# Patient Record
Sex: FEMALE | Race: WHITE | Employment: FULL TIME | URBAN - METROPOLITAN AREA
[De-identification: names, ages, dates, MRNs, and addresses within clinical notes are randomized per-mention and may not be internally consistent; named-entity substitution may affect disease eponyms.]

---

## 2017-07-13 ENCOUNTER — OFFICE VISIT (OUTPATIENT)
Dept: FAMILY MEDICINE | Facility: CLINIC | Age: 37
End: 2017-07-13
Payer: COMMERCIAL

## 2017-07-13 VITALS
WEIGHT: 125 LBS | SYSTOLIC BLOOD PRESSURE: 116 MMHG | DIASTOLIC BLOOD PRESSURE: 68 MMHG | HEIGHT: 65 IN | BODY MASS INDEX: 20.83 KG/M2 | OXYGEN SATURATION: 95 % | TEMPERATURE: 97.5 F | HEART RATE: 108 BPM

## 2017-07-13 DIAGNOSIS — M35.00 SJOGREN'S SYNDROME, WITH UNSPECIFIED ORGAN INVOLVEMENT (H): ICD-10-CM

## 2017-07-13 DIAGNOSIS — H00.14 CHALAZION OF LEFT UPPER EYELID: Primary | ICD-10-CM

## 2017-07-13 PROCEDURE — 99203 OFFICE O/P NEW LOW 30 MIN: CPT | Performed by: FAMILY MEDICINE

## 2017-07-13 RX ORDER — CIPROFLOXACIN HYDROCHLORIDE 3.5 MG/ML
1 SOLUTION/ DROPS TOPICAL
Qty: 1 BOTTLE | Refills: 0 | Status: SHIPPED | OUTPATIENT
Start: 2017-07-13 | End: 2017-07-20

## 2017-07-13 NOTE — PATIENT INSTRUCTIONS
Schedule an appointment with the ophthalmologist. There are 2 ophthalmologists here and I will give you information     There is a rheumatologist here, Dr. Antonio, who is great and would be happy to see you here. Make an appointment with him--this will take about 1-2 months and I will make sure to get you on his wait list.     Olesya Larson, one of the internal nurse practitioners here. I will give you her information as well. I'd recommend a physical-- call to schedule at [phone]    Continue to warm pack the eye 4 times a day  Use the antibiotic drops-- these may not help, but they will not hurt. The ophthalmologist is the next step    [facility]    If you have any questions regarding to your visit please contact your care team:       Team Purple:   Clinic Hours Telephone Number   Dr. Heather Car     7am-7pm  Monday - Thursday   7am-5pm  Fridays  [phone]  (Appointment scheduling available 24/7)    Questions about your Visit?   Team Line:  [phone]   Urgent [facility] - [place] [facility] - 11am-9pm Monday-Friday Saturday-Sunday- 9am-5pm   [place] - 5pm-9pm Monday-Friday Saturday-Sunday- 9am-5pm  [phone] - [place]   [phone] [facility]       What options do I have for visits at the clinic other than the traditional office visit?  To expand how we care for you, many of our providers are utilizing electronic visits (e-visits) and telephone visits, when medically appropriate, for interactions with their patients rather than a visit in the clinic.   We also offer nurse visits for many medical concerns. Just like any other service, we will bill your insurance company for this type of visit based on time spent on the phone with your provider. Not all insurance companies cover these visits. Please check with your medical insurance if this type of visit is covered. You will be responsible for any charges that are not  paid by your insurance.      E-visits via [employer]:  generally incur a $35.00 fee.  Telephone visits:  Time spent on the phone: *charged based on time that is spent on the phone in increments of 10 minutes. Estimated cost:   5-10 mins $30.00   11-20 mins. $59.00   21-30 mins. $85.00     Use iPling (secure email communication and access to your chart) to send your primary care provider a message or make an appointment. Ask someone on your Team how to sign up for iPling.  For a Price Quote for your services, please call our Consumer Price Line at [phone].  As always, Thank you for trusting us with your health care needs!    Discharged by SOFIE Byrd

## 2017-07-13 NOTE — NURSING NOTE
"Chief Complaint   Patient presents with     Eye Problem       Initial /68  Pulse 108  Temp 97.5  F (36.4  C) (Oral)  Ht 5' 4.5\" (1.638 m)  Wt 125 lb (56.7 kg)  SpO2 95%  BMI 21.12 kg/m2 Estimated body mass index is 21.12 kg/(m^2) as calculated from the following:    Height as of this encounter: 5' 4.5\" (1.638 m).    Weight as of this encounter: 125 lb (56.7 kg).  Medication Reconciliation: complete    "

## 2017-07-13 NOTE — Clinical Note
Please abstract the following data from this visit with this patient into the appropriate field in Epic:  Pap smear done on this date: 03/02/16 (approximately), by this group: [facility], results were Normal and Negative HPV see care everywhere.

## 2017-07-13 NOTE — PROGRESS NOTES
SUBJECTIVE:                                                    Mireya Dewitt is a 36 year old female who presents to clinic today for the following health issues:    Eye(s) Problem      Duration: 6 days was seen at [facility] clinic 3 days ago. Symptoms are not improving. Recently diagnosed with Sjogren's Syndrome.     Description:  Location: left  Pain: YES  Redness: YES  Discharge: YES     Accompanying signs and symptoms: none    History (Trauma, foreign body exposure,): None    Precipitating or alleviating factors (contact use): None    Therapies tried and outcome: heat       New patient to clinic. Patient reports that she developed Sx of a stye 6 days ago. She has had a stye once or twice in her life. She states the Sx started out as if it was like a stye, but then she developed a bump on Saturday. It is sore. She was seen at the [facility] clinic and was instructed to use heat packs and over the counter lubricant drops. She also has been using allergy drops because her eye is itchy. At onset of Sx, she states the bump was blocking her vision but that has now resolved. Noticed her eye is still red and irritated and has been waking up in the more with drainage and crustiness from the eye. She otherwise has not noticed changes to her eye vision, she does not wear contact lenses.     Significant recent history of diagnosis of Sjogren's, not on DMARDs, hospitalized at [facility] in 4-2017. Presented with parotitis, diagnosed with Sjogrens. Has had rheumatology follow up, but not happy with current rheumatologist, would like another opinion.  Has not seen ophthalmology.         Problem list and histories reviewed & adjusted, as indicated.  Additional history: as documented    Patient Active Problem List   Diagnosis     Sjogren's syndrome (H)     No past surgical history on file.    Social History   Substance Use Topics     Smoking status: Current Some Day Smoker     Smokeless tobacco: Never Used     Alcohol use Yes     Family  "History   Problem Relation Age of Onset     DIABETES No family hx of          Current Outpatient Prescriptions   Medication Sig Dispense Refill     ciprofloxacin (CILOXAN) 0.3 % ophthalmic solution Apply 1 drop to eye every 4 hours (while awake) for 7 days 1 Bottle 0     No lab results found.   BP Readings from Last 3 Encounters:   07/13/17 116/68    Wt Readings from Last 3 Encounters:   07/13/17 125 lb (56.7 kg)         Reviewed and updated as needed this visit by clinical staff  Tobacco  Allergies  Meds  Problems  Fam Hx  Soc Hx      Reviewed and updated as needed this visit by Provider  Problems         ROS:  Constitutional, HEENT, cardiovascular, pulmonary, GI, , musculoskeletal, neuro, skin, endocrine and psych systems are negative, except as otherwise noted.    This document serves as a record of the services and decisions personally performed and made by Bruna Car MD. It was created on her behalf by Prabha Ramírez, a trained medical scribe. The creation of this document is based the provider's statements to the medical scribe.    Prabha Ramírez July 13, 2017 11:47 AM    OBJECTIVE:     /68  Pulse 108  Temp 97.5  F (36.4  C) (Oral)  Ht 5' 4.5\" (1.638 m)  Wt 125 lb (56.7 kg)  SpO2 95%  BMI 21.12 kg/m2  Body mass index is 21.12 kg/(m^2).  GENERAL: healthy, alert and no distress  EYES: left eye with significant redness and swelling/lump in the upper lid   No significant conjunctival injection  + TTP  No drainage, full EOM  PERRL  No preauricular adenopathy  NECK: no adenopathy, no asymmetry, masses, or scars and thyroid normal to palpation  RESP: lungs clear to auscultation - no rales, rhonchi or wheezes  CV: regular rate and rhythm, normal S1 S2, no S3 or S4, no murmur, click or rub, no peripheral edema and peripheral pulses strong    Diagnostic Test Results:  none     ASSESSMENT/PLAN:         ICD-10-CM    1. Chalazion of left upper eyelid H00.14 ciprofloxacin (CILOXAN) 0.3 % ophthalmic " solution     OPHTHALMOLOGY ADULT REFERRAL   2. Sjogren's syndrome, with unspecified organ involvement (H) M35.00 OPHTHALMOLOGY ADULT REFERRAL     RHEUMATOLOGY REFERRAL     Reviewed that there is no definitive treatment for chalazion and that antibiotics are not helpful. Due to history of mattering and redness at times, will do a course of cipro  Recommend continued warm packs  Ophthalmology appointment and rheumatology referral done.     Patient instructions discussed with patient    Patient Instructions     Schedule an appointment with the ophthalmologist. There are 2 ophthalmologists here and I will give you information     There is a rheumatologist here, Dr. Antonio, who is great and would be happy to see you here. Make an appointment with him--this will take about 1-2 months and I will make sure to get you on his wait list.     Olesya Larson, one of the internal nurse practitioners here. I will give you her information as well. I'd recommend a physical-- call to schedule at [phone]    Continue to warm pack the eye 4 times a day  Use the antibiotic drops-- these may not help, but they will not hurt. The ophthalmologist is the next step        The information in this document, created by the medical scribe for me, accurately reflects the services I personally performed and the decisions made by me. I have reviewed and approved this document for accuracy prior to leaving the patient care area.    Bruna Car MD  [facility]

## 2017-07-13 NOTE — MR AVS SNAPSHOT
After Visit Summary   7/13/2017    Mireya Dewitt    MRN: [id number]           Patient Information     Date Of Birth          1980        Visit Information        Provider Department      7/13/2017 11:30 AM Bruna Car MD [facility]        Today's Diagnoses     Sjogren's syndrome, with unspecified organ involvement (H)    -  1    Chalazion of left upper eyelid          Care Instructions    Schedule an appointment with the ophthalmologist. There are 2 ophthalmologists here and I will give you information     There is a rheumatologist here, Dr. Antonio, who is great and would be happy to see you here. Make an appointment with him--this will take about 1-2 months and I will make sure to get you on his wait list.     Olesya Larson, one of the internal nurse practitioners here. I will give you her information as well. I'd recommend a physical-- call to schedule at [phone]    Continue to warm pack the eye 4 times a day  Use the antibiotic drops-- these may not help, but they will not hurt. The ophthalmologist is the next step    [facility]    If you have any questions regarding to your visit please contact your care team:       Team Purple:   Clinic Hours Telephone Number   Dr. Heather Car     7am-7pm  Monday - Thursday   7am-5pm  Fridays  [phone]  (Appointment scheduling available 24/7)    Questions about your Visit?   Team Line:  [phone]   Urgent Care - [place] - 11am-9pm Monday-Friday Saturday-Sunday- 9am-5pm   [place] - 5pm-9pm Monday-Friday Saturday-Sunday- 9am-5pm  [phone] - [place]   [phone] - [place]       What options do I have for visits at the clinic other than the traditional office visit?  To expand how we care for you, many of our providers are utilizing electronic visits (e-visits) and telephone visits, when medically appropriate, for  interactions with their patients rather than a visit in the clinic.   We also offer nurse visits for many medical concerns. Just like any other service, we will bill your insurance company for this type of visit based on time spent on the phone with your provider. Not all insurance companies cover these visits. Please check with your medical insurance if this type of visit is covered. You will be responsible for any charges that are not paid by your insurance.      E-visits via [employer]:  generally incur a $35.00 fee.  Telephone visits:  Time spent on the phone: *charged based on time that is spent on the phone in increments of 10 minutes. Estimated cost:   5-10 mins $30.00   11-20 mins. $59.00   21-30 mins. $85.00     Use [employer] (secure email communication and access to your chart) to send your primary care provider a message or make an appointment. Ask someone on your Team how to sign up for Ne[employer]oo[employer].  For a Price Quote for your services, please call our [employer] Player Line at [phone].  As always, Thank you for trusting us with your health care needs!    Discharged by SOFIE Byrd            Follow-ups after your visit        Additional Services     OPHTHALMOLOGY ADULT REFERRAL       Your provider has referred you to: [facility]: [facility] [phone]   [email or web address]    Please be aware that coverage of these services is subject to the terms and limitations of your health insurance plan.  Call member services at your health plan with any benefit or coverage questions.      Please bring the following with you to your appointment:    (1) Any X-Rays, CTs or MRIs which have been performed.  Contact the facility where they were done to arrange for  prior to your scheduled appointment.    (2) List of current medications  (3) This referral request   (4) Any documents/labs given to you for this referral            RHEUMATOLOGY REFERRAL       Your provider has  "referred you to: [facility] [phone]   [email or web address]    Please be aware that coverage of these services is subject to the terms and limitations of your health insurance plan.  Call member services at your health plan with any benefit or coverage questions.      Please bring the following with you to your appointment:    (1) Any X-Rays, CTs or MRIs which have been performed.  Contact the facility where they were done to arrange for  prior to your scheduled appointment.    (2) List of current medications   (3) This referral request   (4) Any documents/labs given to you for this referral                  Who to contact     If you have questions or need follow up information about today's clinic visit or your schedule please contact [facility] directly at [phone].  Normal or non-critical lab and imaging results will be communicated to you by MyChart, letter or phone within 4 business days after the clinic has received the results. If you do not hear from us within 7 days, please contact the clinic through MyChart or phone. If you have a critical or abnormal lab result, we will notify you by phone as soon as possible.  Submit refill requests through [employer] or call your pharmacy and they will forward the refill request to us. Please allow 3 business days for your refill to be completed.          Additional Information About Your Visit        MyChart Information     [employer] lets you send messages to your doctor, view your test results, renew your prescriptions, schedule appointments and more. To sign up, go to [email or web address] . Click on \"Log in\" on the left side of the screen, which will take you to the Welcome page. Then click on \"Sign up Now\" on the right side of the page.     You will be asked to enter the access code listed below, as well as some personal information. Please follow the directions to create your username and " "password.     Your access code is: [email or web address]  Expires: 10/11/2017 11:57 AM     Your access code will  in 90 days. If you need help or a new code, please call your [place] clinic or [phone].        Care EveryWhere ID     This is your Care EveryWhere ID. This could be used by other organizations to access your [place] medical records  [id number]        Your Vitals Were     Pulse Temperature Height Pulse Oximetry BMI (Body Mass Index)       108 97.5  F (36.4  C) (Oral) 5' 4.5\" (1.638 m) 95% 21.12 kg/m2        Blood Pressure from Last 3 Encounters:   17 116/68    Weight from Last 3 Encounters:   17 125 lb (56.7 kg)              We Performed the Following     OPHTHALMOLOGY ADULT REFERRAL     RHEUMATOLOGY REFERRAL          Today's Medication Changes          These changes are accurate as of: 17 11:57 AM.  If you have any questions, ask your nurse or doctor.               Start taking these medicines.        Dose/Directions    ciprofloxacin 0.3 % ophthalmic solution   Commonly known as:  CILOXAN   Used for:  Chalazion of left upper eyelid   Started by:  Bruna Car MD        Dose:  1 drop   Apply 1 drop to eye every 4 hours (while awake) for 7 days   Quantity:  1 Bottle   Refills:  0            Where to get your medicines      These medications were sent to [facility] [place] [place] - [address] [place] [address]  [address], [facility] [address]     Phone:  [phone]     ciprofloxacin 0.3 % ophthalmic solution                Primary Care Provider    None Doctor, MD       No address on file        Equal Access to Services     [facility]: Hadii eliz dean hadasho Soomaali, waaxda luqadaha, qaybta kaalmada sahil arevalo . So [facility] [phone].    ATENCIÓN: Si habla español, tiene a arevalo disposición servicios gratuitos de asistencia lingüística. Llame al [phone].    We comply with applicable federal civil rights laws " and [place] laws. We do not discriminate on the basis of race, color, national origin, age, disability sex, sexual orientation or gender identity.            Thank you!     Thank you for choosing [facility]  for your care. Our goal is always to provide you with excellent care. Hearing back from our patients is one way we can continue to improve our services. Please take a few minutes to complete the written survey that you may receive in the mail after your visit with us. Thank you!             Your Updated Medication List - Protect others around you: Learn how to safely use, store and throw away your medicines at www.disposemymeds.org.          This list is accurate as of: 7/13/17 11:57 AM.  Always use your most recent med list.                   Brand Name Dispense Instructions for use Diagnosis    ciprofloxacin 0.3 % ophthalmic solution    CILOXAN    1 Bottle    Apply 1 drop to eye every 4 hours (while awake) for 7 days    Chalazion of left upper eyelid

## 2017-07-14 PROBLEM — K11.20 PAROTITIS NOT DUE TO MUMPS: Status: ACTIVE | Noted: 2017-04-09

## 2017-07-14 PROBLEM — K11.20 PAROTITIS NOT DUE TO MUMPS: Status: RESOLVED | Noted: 2017-04-09 | Resolved: 2017-07-14

## 2017-07-14 PROBLEM — M35.00 SJOGREN'S SYNDROME (H): Status: ACTIVE | Noted: 2017-05-11

## 2017-07-19 ENCOUNTER — OFFICE VISIT (OUTPATIENT)
Dept: OPHTHALMOLOGY | Facility: CLINIC | Age: 37
End: 2017-07-19
Payer: COMMERCIAL

## 2017-07-19 DIAGNOSIS — H04.123 DRY EYES, BILATERAL: ICD-10-CM

## 2017-07-19 DIAGNOSIS — M35.00 SJOGREN'S SYNDROME, WITH UNSPECIFIED ORGAN INVOLVEMENT (H): ICD-10-CM

## 2017-07-19 DIAGNOSIS — H00.14 CHALAZION OF LEFT UPPER EYELID: Primary | ICD-10-CM

## 2017-07-19 PROCEDURE — 92002 INTRM OPH EXAM NEW PATIENT: CPT | Performed by: STUDENT IN AN ORGANIZED HEALTH CARE EDUCATION/TRAINING PROGRAM

## 2017-07-19 ASSESSMENT — VISUAL ACUITY
OS_SC: 20/25
OS_PH_SC: 20/20
OD_PH_SC: 20/20
OD_SC: 20/40
METHOD: SNELLEN - LINEAR

## 2017-07-19 ASSESSMENT — SLIT LAMP EXAM - LIDS: COMMENTS: NORMAL

## 2017-07-19 ASSESSMENT — TONOMETRY
IOP_METHOD: ICARE
OS_IOP_MMHG: 11
OD_IOP_MMHG: 12

## 2017-07-19 ASSESSMENT — EXTERNAL EXAM - RIGHT EYE: OD_EXAM: NORMAL

## 2017-07-19 ASSESSMENT — EXTERNAL EXAM - LEFT EYE: OS_EXAM: NORMAL

## 2017-07-19 NOTE — PATIENT INSTRUCTIONS
"Chalazion  There are tiny oil glands in the upper eyelid near the eyelashes. They help lubricate the eyes.   If these glands become blocked, a small hard lump forms in the upper eyelid, called a chalazion.   The lump can take several weeks to grow, causing pain and tenderness, sensitivity to light, and increased tearing.    Home Care  1. Apply a hot compress for 15 minutes at least 4 times a day. Use a microwaveable rice or gel heat pack.  This will reduce the swelling and soften the hardened oils blocking the duct.   2. Massage the area gently after applying the compress to promote drainage.  Do not try to pop or squeeze the chalazion.  3. Once a day, with eyes closed, clean your eyelids with baby shampoo to help  reduce clogging of the duct, as well as help prevent recurrences.  If the bump does not resolve with hot packing after a few weeks, we may be able to  incise and drain the bump.  Use artificial tears up to 4 times per day (Refresh Plus, Systane Balance, or generic artificial tears are ok. Avoid \"get the red out\" drops).   Darlin Mock MD  [phone]      "

## 2017-07-19 NOTE — MR AVS SNAPSHOT
"              After Visit Summary   7/19/2017    Mireya Dewitt    MRN: [id number]           Patient Information     Date Of Birth          1980        Visit Information        Provider Department      7/19/2017 3:15 PM Darlin Mock MD [facility] [place]        Today's Diagnoses     Chalazion of left upper eyelid    -  1    Sjogren's syndrome, with unspecified organ involvement (H)          Care Instructions    Chalazion  There are tiny oil glands in the upper eyelid near the eyelashes. They help lubricate the eyes.   If these glands become blocked, a small hard lump forms in the upper eyelid, called a chalazion.   The lump can take several weeks to grow, causing pain and tenderness, sensitivity to light, and increased tearing.    Home Care  1. Apply a hot compress for 15 minutes at least 4 times a day. Use a microwaveable rice or gel heat pack.  This will reduce the swelling and soften the hardened oils blocking the duct.   2. Massage the area gently after applying the compress to promote drainage.  Do not try to pop or squeeze the chalazion.  3. Once a day, with eyes closed, clean your eyelids with baby shampoo to help  reduce clogging of the duct, as well as help prevent recurrences.  If the bump does not resolve with hot packing after a few weeks, we may be able to  incise and drain the bump.  Use artificial tears up to 4 times per day (Refresh Plus, Systane Balance, or generic artificial tears are ok. Avoid \"get the red out\" drops).   Darlin Mock MD  [phone]              Follow-ups after your visit        Follow-up notes from your care team     Return for return for incision and drainage if bump does not resolve.      Your next 10 appointments already scheduled     Sep 13, 2017  2:20 PM CDT   New Visit with Ravi Antonio MD   [facility] [place] ([facility]) [address]  [place] [address] [phone]              Who to contact     If you " "have questions or need follow up information about today's clinic visit or your schedule please contact [facility] directly at [phone].  Normal or non-critical lab and imaging results will be communicated to you by MyChart, letter or phone within 4 business days after the clinic has received the results. If you do not hear from us within 7 days, please contact the clinic through MyChart or phone. If you have a critical or abnormal lab result, we will notify you by phone as soon as possible.  Submit refill requests through "[employer]" or call your pharmacy and they will forward the refill request to us. Please allow 3 business days for your refill to be completed.          Additional Information About Your Visit        [employer] Information     "[employer] lets you send messages to your doctor, view your test results, renew your prescriptions, schedule appointments and more. To sign up, go to [email or web address] . Click on \"Log in\" on the left side of the screen, which will take you to the Welcome page. Then click on \"Sign up Now\" on the right side of the page.     You will be asked to enter the access code listed below, as well as some personal information. Please follow the directions to create your username and password.     Your access code is: [email or web address]  Expires: 10/11/2017 11:57 AM     Your access code will  in 90 days. If you need help or a new code, please call your [place] clinic or [phone].        Care EveryWhere ID     This is your Care EveryWhere ID. This could be used by other organizations to access your [place] medical records  [id number]         Blood Pressure from Last 3 Encounters:   17 116/68    Weight from Last 3 Encounters:   17 56.7 kg (125 lb)              Today, you had the following     No orders found for display       Primary Care Provider    None Doctor, MD       No address on file        Equal Access to Services     KIRK REILLY : Hadii aad dianne " chantale Yañez, william marinaadaha, frandy kaquan arevalo, sahil hope charleychristina hobbs lalibiadeirck francy. So [facility] [phone].    ATENCIÓN: Si habla español, tiene a arevalo disposición servicios gratuitos de asistencia lingüística. Brianna al [phone].    We comply with applicable federal civil rights laws and [place] laws. We do not discriminate on the basis of race, color, national origin, age, disability sex, sexual orientation or gender identity.            Thank you!     Thank you for choosing [facility]  for your care. Our goal is always to provide you with excellent care. Hearing back from our patients is one way we can continue to improve our services. Please take a few minutes to complete the written survey that you may receive in the mail after your visit with us. Thank you!             Your Updated Medication List - Protect others around you: Learn how to safely use, store and throw away your medicines at www.disposemymeds.org.          This list is accurate as of: 7/19/17  4:24 PM.  Always use your most recent med list.                   Brand Name Dispense Instructions for use Diagnosis    ciprofloxacin 0.3 % ophthalmic solution    CILOXAN    1 Bottle    Apply 1 drop to eye every 4 hours (while awake) for 7 days    Chalazion of left upper eyelid

## 2017-07-19 NOTE — PROGRESS NOTES
Current Eye Medications:  ***     Subjective:  Here for cyst on the left eye x 12 days now and dry eyes     Objective:  See Ophthalmology Exam.       Assessment:      Plan:   See Patient Instructions.

## 2017-07-19 NOTE — PROGRESS NOTES
" Current Eye Medications:  Ciprofloxacin os qid.     Subjective:  Chalazion os  Pt os has started to be become inflamed and swollen about two weeks ago. Pt saw Dr. Car 6 days ago (dxed with chalazion os ) was give Cipro drops to use qid of a week. Pt states her eyes is really much better now only has a small lump not where this eye had been previously  been so inflamed  it was almost swollen shut. Pt has Sjogrens syndrome.     Objective:  See Ophthalmology Exam.       Assessment:  Mireya Dewitt is a 36 year old female who presents with:   Encounter Diagnoses   Name Primary?     Chalazion of left upper eyelid Discussed treatment below.     Sjogren's syndrome, with unspecified organ involvement (H)      Dry eyes, bilateral Recommend artificial tears both eyes.        Plan:  1. Apply a hot compress for 15 minutes at least 4 times a day. Use a microwaveable rice or gel heat pack.  This will reduce the swelling and soften the hardened oils blocking the duct.   2. Massage the area gently after applying the compress to promote drainage.  Do not try to pop or squeeze the chalazion.  3. Once a day, with eyes closed, clean your eyelids with baby shampoo to help  reduce clogging of the duct, as well as help prevent recurrences.  If the bump does not resolve with hot packing after a few weeks, we may be able to  incise and drain the bump.  Use artificial tears up to 4 times per day (Refresh Plus, Systane Balance, or generic artificial tears are ok. Avoid \"get the red out\" drops).   Darlin Mock MD  [phone]        "

## 2017-10-25 ENCOUNTER — OFFICE VISIT (OUTPATIENT)
Dept: RHEUMATOLOGY | Facility: CLINIC | Age: 37
End: 2017-10-25
Payer: COMMERCIAL

## 2017-10-25 VITALS
WEIGHT: 125 LBS | RESPIRATION RATE: 14 BRPM | TEMPERATURE: 97.6 F | OXYGEN SATURATION: 100 % | HEIGHT: 64 IN | BODY MASS INDEX: 21.34 KG/M2 | HEART RATE: 85 BPM | SYSTOLIC BLOOD PRESSURE: 111 MMHG | DIASTOLIC BLOOD PRESSURE: 57 MMHG

## 2017-10-25 DIAGNOSIS — O09.299 HISTORY OF MISCARRIAGE, CURRENTLY PREGNANT: ICD-10-CM

## 2017-10-25 DIAGNOSIS — M35.01 SJOGREN'S SYNDROME WITH KERATOCONJUNCTIVITIS SICCA (H): Primary | ICD-10-CM

## 2017-10-25 DIAGNOSIS — M06.4 INFLAMMATORY POLYARTHROPATHY (H): ICD-10-CM

## 2017-10-25 LAB
ALBUMIN SERPL-MCNC: 3.1 G/DL (ref 3.4–5)
ALBUMIN UR-MCNC: NEGATIVE MG/DL
ALP SERPL-CCNC: 60 U/L (ref 40–150)
ALT SERPL W P-5'-P-CCNC: 23 U/L (ref 0–50)
ANION GAP SERPL CALCULATED.3IONS-SCNC: 6 MMOL/L (ref 3–14)
APPEARANCE UR: ABNORMAL
AST SERPL W P-5'-P-CCNC: 17 U/L (ref 0–45)
BASOPHILS # BLD AUTO: 0 10E9/L (ref 0–0.2)
BASOPHILS NFR BLD AUTO: 0 %
BILIRUB SERPL-MCNC: 0.4 MG/DL (ref 0.2–1.3)
BILIRUB UR QL STRIP: NEGATIVE
BUN SERPL-MCNC: 7 MG/DL (ref 7–30)
CALCIUM SERPL-MCNC: 8.4 MG/DL (ref 8.5–10.1)
CHLORIDE SERPL-SCNC: 107 MMOL/L (ref 94–109)
CK SERPL-CCNC: 24 U/L (ref 30–225)
CO2 SERPL-SCNC: 23 MMOL/L (ref 20–32)
COLOR UR AUTO: YELLOW
CREAT SERPL-MCNC: 0.37 MG/DL (ref 0.52–1.04)
CREAT UR-MCNC: 20 MG/DL
CRP SERPL-MCNC: <2.9 MG/L (ref 0–8)
DIFFERENTIAL METHOD BLD: ABNORMAL
EOSINOPHIL # BLD AUTO: 0 10E9/L (ref 0–0.7)
EOSINOPHIL NFR BLD AUTO: 0.4 %
ERYTHROCYTE [DISTWIDTH] IN BLOOD BY AUTOMATED COUNT: 13.5 % (ref 10–15)
ERYTHROCYTE [SEDIMENTATION RATE] IN BLOOD BY WESTERGREN METHOD: 35 MM/H (ref 0–20)
GFR SERPL CREATININE-BSD FRML MDRD: >90 ML/MIN/1.7M2
GLUCOSE SERPL-MCNC: 78 MG/DL (ref 70–99)
GLUCOSE UR STRIP-MCNC: NEGATIVE MG/DL
HCT VFR BLD AUTO: 32.2 % (ref 35–47)
HGB BLD-MCNC: 11 G/DL (ref 11.7–15.7)
HGB UR QL STRIP: NEGATIVE
KETONES UR STRIP-MCNC: NEGATIVE MG/DL
LEUKOCYTE ESTERASE UR QL STRIP: ABNORMAL
LYMPHOCYTES # BLD AUTO: 1.3 10E9/L (ref 0.8–5.3)
LYMPHOCYTES NFR BLD AUTO: 27.9 %
MCH RBC QN AUTO: 28.5 PG (ref 26.5–33)
MCHC RBC AUTO-ENTMCNC: 34.2 G/DL (ref 31.5–36.5)
MCV RBC AUTO: 83 FL (ref 78–100)
MONOCYTES # BLD AUTO: 0.5 10E9/L (ref 0–1.3)
MONOCYTES NFR BLD AUTO: 10.4 %
NEUTROPHILS # BLD AUTO: 2.9 10E9/L (ref 1.6–8.3)
NEUTROPHILS NFR BLD AUTO: 61.3 %
NITRATE UR QL: NEGATIVE
NON-SQ EPI CELLS #/AREA URNS LPF: ABNORMAL /LPF
PH UR STRIP: 6.5 PH (ref 5–7)
PLATELET # BLD AUTO: 208 10E9/L (ref 150–450)
POTASSIUM SERPL-SCNC: 3.9 MMOL/L (ref 3.4–5.3)
PROT SERPL-MCNC: 7.6 G/DL (ref 6.8–8.8)
PROT UR-MCNC: <0.05 G/L
PROT/CREAT 24H UR: NORMAL G/G CR (ref 0–0.2)
RBC # BLD AUTO: 3.86 10E12/L (ref 3.8–5.2)
RBC #/AREA URNS AUTO: ABNORMAL /HPF
SODIUM SERPL-SCNC: 136 MMOL/L (ref 133–144)
SOURCE: ABNORMAL
SP GR UR STRIP: <=1.005 (ref 1–1.03)
UROBILINOGEN UR STRIP-ACNC: 0.2 EU/DL (ref 0.2–1)
WBC # BLD AUTO: 4.8 10E9/L (ref 4–11)
WBC #/AREA URNS AUTO: ABNORMAL /HPF

## 2017-10-25 PROCEDURE — 85652 RBC SED RATE AUTOMATED: CPT | Performed by: INTERNAL MEDICINE

## 2017-10-25 PROCEDURE — 85730 THROMBOPLASTIN TIME PARTIAL: CPT | Performed by: INTERNAL MEDICINE

## 2017-10-25 PROCEDURE — 86146 BETA-2 GLYCOPROTEIN ANTIBODY: CPT | Performed by: INTERNAL MEDICINE

## 2017-10-25 PROCEDURE — 36415 COLL VENOUS BLD VENIPUNCTURE: CPT | Performed by: INTERNAL MEDICINE

## 2017-10-25 PROCEDURE — 00000167 ZZHCL STATISTIC INR NC: Performed by: INTERNAL MEDICINE

## 2017-10-25 PROCEDURE — 86140 C-REACTIVE PROTEIN: CPT | Performed by: INTERNAL MEDICINE

## 2017-10-25 PROCEDURE — 85613 RUSSELL VIPER VENOM DILUTED: CPT | Performed by: INTERNAL MEDICINE

## 2017-10-25 PROCEDURE — 00000401 ZZHCL STATISTIC THROMBIN TIME NC: Performed by: INTERNAL MEDICINE

## 2017-10-25 PROCEDURE — 80053 COMPREHEN METABOLIC PANEL: CPT | Performed by: INTERNAL MEDICINE

## 2017-10-25 PROCEDURE — 86225 DNA ANTIBODY NATIVE: CPT | Performed by: INTERNAL MEDICINE

## 2017-10-25 PROCEDURE — 99204 OFFICE O/P NEW MOD 45 MIN: CPT | Performed by: INTERNAL MEDICINE

## 2017-10-25 PROCEDURE — 85025 COMPLETE CBC W/AUTO DIFF WBC: CPT | Performed by: INTERNAL MEDICINE

## 2017-10-25 PROCEDURE — 86160 COMPLEMENT ANTIGEN: CPT | Performed by: INTERNAL MEDICINE

## 2017-10-25 PROCEDURE — 84156 ASSAY OF PROTEIN URINE: CPT | Performed by: INTERNAL MEDICINE

## 2017-10-25 PROCEDURE — 81001 URINALYSIS AUTO W/SCOPE: CPT | Performed by: INTERNAL MEDICINE

## 2017-10-25 PROCEDURE — 82550 ASSAY OF CK (CPK): CPT | Performed by: INTERNAL MEDICINE

## 2017-10-25 PROCEDURE — 86200 CCP ANTIBODY: CPT | Performed by: INTERNAL MEDICINE

## 2017-10-25 PROCEDURE — 86147 CARDIOLIPIN ANTIBODY EA IG: CPT | Performed by: INTERNAL MEDICINE

## 2017-10-25 RX ORDER — PRENATAL VIT/IRON FUM/FOLIC AC 27MG-0.8MG
1 TABLET ORAL DAILY
COMMUNITY

## 2017-10-25 NOTE — PROGRESS NOTES
Rheumatology Clinic Visit      Mireya Dewitt MRN# [id number]   YOB: 1980 Age: 37 year old      Date of visit: 10/25/17   Referring provider: Dr. Bruna Car  OB: [facility], located in the [facility]    Chief Complaint   Patient presents with:  Consult    Assessment and Plan     1. Sjogren's Syndrome (THOMAS 1:31854 speckled, SSA+, RF+; history of neutropenia, hx of parotid gland swelling): Negative RNP, Sm, SSB; labs were done at [facility] [place] [place] in April and May 2017.  Currently without other symptoms to suggest lupus. Arthritis component is discussed in #2. We reviewed the increased risk for lymphoma in the setting of Sjogren's Syndrome.  Reviewed the symptomatic treatment options for Sjogren's Syndrome.  Treatment of dry mouth due to salivary gland hypofunction is aimed at alleviating symptoms and prevention of complications such as dental caries, periodontal disease, and dysphagia.  Treatments, that are backed up by limited data and have variable results from person to person, include: (1) frequent sips of water, drinking sugar-free liquids, avoidance of oral irritants (coffee, alcohol, nicotine), and avoidance of acidic drinks (carbonated and high sugar drinks); (2) avoidance of medications with anticholinergic side effects; (3) regular preventative dental care; (4) use of artificial saliva products (typically over the counter), and others if needed. Treatment of dry eyes is focused on symptom relief.  Treatments include: (1) environmental control (avoid areas with dry, windy, or polluted air, and avoid prolonged screen time without breaks); (2) avoid medications that worsen dry eyes if possible (i.e. antidepressants, antihistamines, anticholinergics, diuretics); (3) artificial tears (preservative free may help reduce possible irritation); (4) punctal plugs; and other if needed.   - Labs today: CBC, CMP, ESR, CRP, dsDNA, C3, C4, APS labs, CK, UA, Uprotein:creatinine    2.  Inflammatory arthritis:  Symptoms are consistent with an inflammatory arthritis that involve her PIPs bilaterally, but no synovitis on exam.  Pain is worse in the morning, and morning stiffness for most of the day; pain and stiffness improve with activity.  Most likely this is a component of Sjogren's Syndrome but will also check CCP.  Would check hand x-rays but she is currently pregnant and this is not urgent. We discussed the option of hydroxychloroquine but she prefers to avoid medications for now and is tolerating her symptoms.   - Lab today: CCP    3. Pregnancy in the setting of a positive SSA / Miscarriage History: She has a history of a 1st trimester miscarriage and in the setting of a positive THOMAS, additional labs are being checked today as noted below. If the antiphospholipid antibody tests are positive, then she may benefit from seeing hematology.With regard to the positive SSA, there is an increased risk for developing congenital heart block or  lupus; this will require increased fetal surveillance for heart block.  We reviewed the benefits of hydroxychloroquine and its association with a reduced rate of congenital heart block.  She prefers to avoid additional medications at this time.   - Labs today: Cardiolipin antibody, lupus anticoagulant, beta 2 glycoprotein    4. Smoking:  Encouraged complete cessation and discussed the health benefits.      Ms. Dewitt verbalized agreement with and understanding of the rational for the diagnosis and treatment plan.  All questions were answered to best of my ability and the patient's satisfaction. Ms. Dewitt was advised to contact the clinic with any questions that may arise after the clinic visit.      Thank you for involving me in the care of the patient    Return to clinic: 2 months      HPI   Mireya Dewitt is a 37 year old female with a past medical history significant for Sjogren's Syndrome who is seen in consultation at the request of Dr. Mcfarland  Josep for evaluation of Sjogren's Syndrome.    5/8/2017 [facility] rheumatology clinic note by Dr. Lenny Arriaga documents Sjogren's Syndrome and neutropenia.  Hospitalized for parotid swelling associated with dry eyes and mouth, polyarthralgias, and abnml serologies.  Since hospitalization, joint symptoms improved but morning stiffness for 4-6 hours.  On iron for microcytic anemia.  Symptoms started during her first pregnancy during 2003 with diffuse joint symptoms.  Dryness started in 2010. 2010 dx'd with IBS; at which time she had a high polyclonal IgG and elevated IgE. Anti-TTA ab negative.  12/2015 fetal loss early in pregnancy.  Proteinuria and thrombocytopenia during pregnancies.      Today, Ms. Dewitt reports that over the years she has had parotid gland swelling that was successfully treated conservatively, but then she had an episode of painful parotid gland swelling that resulted in hospitalization.  Dry mouth for many years that she attributed to allergies and asthma.  Had a stye and saw Dr. Car that led to this visit and seeing Dr. Mock in ophthalmology.  Joint pain in her PIPs, worse in the morning and shoulder; better with activity.  Morning stiffness for most of the day.      She says that she is currently pregnant; 15 weeks.  OB: [facility] [place].     Denies fevers, chills, nausea, vomiting, constipation, diarrhea. No abdominal pain. No chest pain/pressure, palpitations, or shortness of breath. No LE swelling. No neck pain. No oral or nasal sores.  Periodically with itchy rash on neck and chest, but none now.  No photosensitivity or photophobia. No eye pain or redness. No history of inflammatory eye disease.  No history of DVT or pulmonary embolism; one 1st trimester miscarriage; two healthy daughters now.  No history of serositis.  No history of Raynaud's Phenomenon.  No seizure history.  No known renal disorder.      Tobacco: off and on, socially in the past but  "none now  EtOH: occasional, but none since she found out she is pregnant  Drugs: none  Occupation: cuts hair    ROS   GEN: No fevers, chills, night sweats, fatigue, or weight change  SKIN: No itching, rashes, sores  HEENT: No epistaxis. No oral or nasal ulcers.  CV: No chest pain, pressure, palpitations, or dyspnea on exertion.  PULM: No SOB, wheeze, cough.  GI: No nausea, vomiting, constipation, diarrhea. No blood in stool. No abdominal pain.  : No blood in urine.  MSK: See HPI.  NEURO: No numbness, tingling, or weakness.  ENDO: No heat/cold intolerance.  EXT: No LE swelling  PSYCH: Negative    Active Problem List     Patient Active Problem List   Diagnosis     Sjogren's syndrome (H)     Past Medical History     Past Medical History:   Diagnosis Date     Parotitis not due to mumps 4/9/2017     Severe pre-eclampsia 8/26/2016     Past Surgical History   No past surgical history on file.  Allergy   No Known Allergies  Current Medication List     Current Outpatient Prescriptions   Medication Sig     Prenatal Vit-Fe Fumarate-FA (PRENATAL MULTIVITAMIN PLUS IRON) 27-0.8 MG TABS per tablet Take 1 tablet by mouth daily     No current facility-administered medications for this visit.          Social History   See HPI    Family History     Family History   Problem Relation Age of Onset     DIABETES No family hx of      Maternal aunt and her daughter: celiac  Maternal grandmother's sister's grandchild: celiac    Physical Exam     Temp Readings from Last 3 Encounters:   10/25/17 97.6  F (36.4  C)   07/13/17 97.5  F (36.4  C) (Oral)     BP Readings from Last 5 Encounters:   10/25/17 111/57   07/13/17 116/68     Pulse Readings from Last 1 Encounters:   10/25/17 85     Resp Readings from Last 1 Encounters:   10/25/17 14     Estimated body mass index is 21.46 kg/(m^2) as calculated from the following:    Height as of this encounter: 1.626 m (5' 4\").    Weight as of this encounter: 56.7 kg (125 lb).    GEN: NAD  HEENT: Dry mucous " membranes. No oral lesions. EOMI. anicteric, noninjected sclera. Eyes appeared dry by gross examination   CV: S1, S2. RRR. No m/r/g.  PULM: CTA bilaterally. No w/c.  ABD: +BS.   MSK: PIPs diffusely tender to palpation but without swelling. MCPs, DIPs, wrists, elbows, shoulders, knees, ankles, and MTPs without swelling or tenderness to palpation. Hips nontender to direct palpation.   NEURO: UE and LE strengths 5/5 and equal bilaterally.   SKIN: No rash  EXT: No LE edema  PSYCH: Alert. Appropriate.    Labs / Imaging (select studies)     CBC  Recent Labs   Lab Test  06/16/10   0926  05/03/10   1150   WBC  3.9*  4.3   RBC   --   4.47   HGB   --   12.6   HCT   --   37.9   MCV   --   85   RDW   --   13.0   PLT   --   213   MCH   --   28.2   MCHC   --   33.2   NEUTROPHIL  45  52   LYMPH  38  35   MONOCYTE  11  6   EOSINOPHIL  6  7*   BASOPHIL  0  0   ANEU  1.8  2.3   ALYM  1.5  1.5   CAROLINA  0.4  0.3   AEOS  0.2  0.3   ABAS  0.0  0.0       Immunization History     Immunization History   Administered Date(s) Administered     Tetanus 07/28/2016          Chart documentation done in part with Dragon Voice recognition Software. Although reviewed after completion, some word and grammatical error may remain.    Ravi Antonio MD

## 2017-10-25 NOTE — Clinical Note
Please fax my clinic note dated 10/25/2017 to Ms. Dewitt's OB:  [facility], located in the [facility]  Thank you, Ravi Antonio MD 10/25/2017 1:42 PM

## 2017-10-25 NOTE — NURSING NOTE
"Chief Complaint   Patient presents with     Consult       Initial /57  Pulse 85  Temp 97.6  F (36.4  C)  Resp 14  Ht 5' 4\" (1.626 m)  Wt 125 lb (56.7 kg)  SpO2 100%  BMI 21.46 kg/m2 Estimated body mass index is 21.46 kg/(m^2) as calculated from the following:    Height as of this encounter: 5' 4\" (1.626 m).    Weight as of this encounter: 125 lb (56.7 kg).  BP completed using cuff size: regular         RAPID3 (0-30) Cumulative Score  10.3          RAPID3 Weighted Score (divide #4 by 3 and that is the weighted score)  3.43       "

## 2017-10-25 NOTE — MR AVS SNAPSHOT
After Visit Summary   10/25/2017    Mireya Dewitt    MRN: [id number]           Patient Information     Date Of Birth          1980        Visit Information        Provider Department      10/25/2017 9:20 AM Ravi Antonio MD [facility] [place]        Today's Diagnoses     Sjogren's syndrome with keratoconjunctivitis sicca (H)    -  1    History of miscarriage, currently pregnant        Inflammatory polyarthropathy (H)           Follow-ups after your visit        Follow-up notes from your care team     Return in about 3 months (around 1/25/2018) for f/u Sjogren's, Arthritis.      Your next 10 appointments already scheduled     Jan 03, 2018 11:40 AM CST   Return Visit with Ravi Antonio MD   [facility] [place] ([facility])    [address] [place] [address]  [place] [address] [phone]              Future tests that were ordered for you today     Open Future Orders        Priority Expected Expires Ordered    CBC with platelets differential Routine 12/18/2017 1/6/2018 10/25/2017    CK total Routine 12/18/2017 1/6/2018 10/25/2017    Complement C3 Routine 12/18/2017 1/6/2018 10/25/2017    DNA double stranded antibodies Routine 12/18/2017 1/6/2018 10/25/2017    Complement C4 Routine 12/18/2017 1/6/2018 10/25/2017    Erythrocyte sedimentation rate auto Routine 12/18/2017 1/6/2018 10/25/2017    Protein  random urine with Creat Ratio Routine 12/18/2017 1/6/2018 10/25/2017    UA with Microscopic reflex to Culture Routine 12/18/2017 1/6/2018 10/25/2017    Comprehensive metabolic panel Routine 12/18/2017 1/6/2018 10/25/2017    CRP inflammation Routine 12/18/2017 1/6/2018 10/25/2017            Who to contact     If you have questions or need follow up information about today's clinic visit or your schedule please contact [place] [facility] directly at [phone].  Normal or non-critical lab and imaging results will be communicated to you by MyChart, letter or phone within 4  "business days after the clinic has received the results. If you do not hear from us within 7 days, please contact the clinic through [employer] or phone. If you have a critical or abnormal lab result, we will notify you by phone as soon as possible.  Submit refill requests through [employer] or call your pharmacy and they will forward the refill request to us. Please allow 3 business days for your refill to be completed.          Additional Information About Your Visit        [employer] Information     [employer] lets you send messages to your doctor, view your test results, renew your prescriptions, schedule appointments and more. To sign up, go to [email or web address] . Click on \"Log in\" on the left side of the screen, which will take you to the Welcome page. Then click on \"Sign up Now\" on the right side of the page.     You will be asked to enter the access code listed below, as well as some personal information. Please follow the directions to create your username and password.     Your access code is: [email or web address]  Expires: 2018  1:42 PM     Your access code will  in 90 days. If you need help or a new code, please call your [place] clinic or [phone].        Care EveryWhere ID     This is your Care EveryWhere ID. This could be used by other organizations to access your [place] medical records  [id number]        Your Vitals Were     Pulse Temperature Respirations Height Pulse Oximetry BMI (Body Mass Index)    85 97.6  F (36.4  C) 14 1.626 m (5' 4\") 100% 21.46 kg/m2       Blood Pressure from Last 3 Encounters:   10/25/17 111/57   17 116/68    Weight from Last 3 Encounters:   10/25/17 56.7 kg (125 lb)   17 56.7 kg (125 lb)              We Performed the Following     Beta 2 Glycoprotein 1 Antibody IgG     Beta 2 Glycoprotein 1 Antibody IgM     Cardiolipin Micheline IgG and IgM     CBC with platelets differential     CK total     Complement C3     Complement C4     Comprehensive metabolic panel     " Creatinine urine calculation only     CRP inflammation     Cyclic Citrullinated Peptide Antibody IgG     DNA double stranded antibodies     Erythrocyte sedimentation rate auto     Lupus Anticoagulant Panel     Protein  random urine with Creat Ratio     UA with Microscopic reflex to Culture        Primary Care Provider Office Phone # Fax #    [facility] [phone] [address] [address]        Equal Access to Services     KIRK REILLY : Hadii aad ku hadasho Soomaali, waaxda luqadaha, qaybta kaalmada adeegyada, waxay idiin hayaan adeeg khmarlenysh laradha . So [facility] [phone].    ATENCIÓN: Si habla español, tiene a arevalo disposición servicios gratuitos de asistencia lingüística. Brianna al [phone].    We comply with applicable federal civil rights laws and [place] laws. We do not discriminate on the basis of race, color, national origin, age, disability, sex, sexual orientation, or gender identity.            Thank you!     Thank you for choosing [facility]  for your care. Our goal is always to provide you with excellent care. Hearing back from our patients is one way we can continue to improve our services. Please take a few minutes to complete the written survey that you may receive in the mail after your visit with us. Thank you!             Your Updated Medication List - Protect others around you: Learn how to safely use, store and throw away your medicines at www.disposemymeds.org.          This list is accurate as of: 10/25/17  1:43 PM.  Always use your most recent med list.                   Brand Name Dispense Instructions for use Diagnosis    DISEASE-MODIFYING ANTIRHEUMATIC DRUG NOT PRESCRIBED (INTENTIONAL)     1 each    Take 1 Units by mouth once        prenatal multivitamin plus iron 27-0.8 MG Tabs per tablet      Take 1 tablet by mouth daily

## 2017-10-26 LAB
B2 GLYCOPROT1 IGG SERPL IA-ACNC: 0.8 U/ML
B2 GLYCOPROT1 IGM SERPL IA-ACNC: 1.1 U/ML
C3 SERPL-MCNC: 86 MG/DL (ref 76–169)
C4 SERPL-MCNC: 16 MG/DL (ref 15–50)
CARDIOLIPIN ANTIBODY IGG: <1.6 GPL-U/ML (ref 0–19.9)
CARDIOLIPIN ANTIBODY IGM: <0.2 MPL-U/ML (ref 0–19.9)
CCP AB SER IA-ACNC: 1 U/ML
DSDNA AB SER-ACNC: 1 IU/ML
LA PPP-IMP: NEGATIVE

## 2017-10-26 NOTE — NURSING NOTE
Clinic notes faxed to Please fax my clinic note dated 10/25/2017 to Ms. Dewitt's OB:     [facility], located in the [facility]  10/26/2017 7:24 AM

## 2017-11-01 NOTE — PROGRESS NOTES
Rheumatology team: Please call to notify Ms. Dewitt that her labs do not suggest an increased disease activity. No change to the rheumatology plan based on these findings.     Ravi Antonio MD  11/1/2017 12:26 PM

## 2017-11-06 NOTE — PROGRESS NOTES
Rheumatology team: Please call to notify Ms. Dewitt that with the antibody profile that she has the baby is at higher risk for congenital heart block and  lupus.  Therefore, she would benefit from having increased fetal surveillance for congenital heart block and I recommend that she follow with a high risk OB.  It was my understanding from my conversation with Ms. Dewitt during her appointment that her OB was going to have her see a high risk OB next.     Ravi Antonio MD  2017 5:44 AM

## 2018-01-03 ENCOUNTER — OFFICE VISIT (OUTPATIENT)
Dept: RHEUMATOLOGY | Facility: CLINIC | Age: 38
End: 2018-01-03
Payer: COMMERCIAL

## 2018-01-03 VITALS
HEART RATE: 95 BPM | DIASTOLIC BLOOD PRESSURE: 58 MMHG | WEIGHT: 136.6 LBS | BODY MASS INDEX: 23.32 KG/M2 | HEIGHT: 64 IN | OXYGEN SATURATION: 99 % | SYSTOLIC BLOOD PRESSURE: 108 MMHG

## 2018-01-03 DIAGNOSIS — M35.01 SJOGREN'S SYNDROME WITH KERATOCONJUNCTIVITIS SICCA (H): Primary | ICD-10-CM

## 2018-01-03 DIAGNOSIS — R82.90 NONSPECIFIC FINDING ON EXAMINATION OF URINE: ICD-10-CM

## 2018-01-03 LAB
ALBUMIN SERPL-MCNC: 2.6 G/DL (ref 3.4–5)
ALBUMIN UR-MCNC: NEGATIVE MG/DL
ALP SERPL-CCNC: 68 U/L (ref 40–150)
ALT SERPL W P-5'-P-CCNC: 16 U/L (ref 0–50)
ANION GAP SERPL CALCULATED.3IONS-SCNC: 8 MMOL/L (ref 3–14)
APPEARANCE UR: CLEAR
AST SERPL W P-5'-P-CCNC: 19 U/L (ref 0–45)
BACTERIA #/AREA URNS HPF: ABNORMAL /HPF
BASOPHILS # BLD AUTO: 0 10E9/L (ref 0–0.2)
BASOPHILS NFR BLD AUTO: 0 %
BILIRUB SERPL-MCNC: 0.2 MG/DL (ref 0.2–1.3)
BILIRUB UR QL STRIP: NEGATIVE
BUN SERPL-MCNC: 8 MG/DL (ref 7–30)
CALCIUM SERPL-MCNC: 8.3 MG/DL (ref 8.5–10.1)
CHLORIDE SERPL-SCNC: 106 MMOL/L (ref 94–109)
CK SERPL-CCNC: 33 U/L (ref 30–225)
CO2 SERPL-SCNC: 23 MMOL/L (ref 20–32)
COLOR UR AUTO: YELLOW
CREAT SERPL-MCNC: 0.46 MG/DL (ref 0.52–1.04)
CRP SERPL-MCNC: <2.9 MG/L (ref 0–8)
DIFFERENTIAL METHOD BLD: ABNORMAL
EOSINOPHIL # BLD AUTO: 0 10E9/L (ref 0–0.7)
EOSINOPHIL NFR BLD AUTO: 0.6 %
ERYTHROCYTE [DISTWIDTH] IN BLOOD BY AUTOMATED COUNT: 13.8 % (ref 10–15)
ERYTHROCYTE [SEDIMENTATION RATE] IN BLOOD BY WESTERGREN METHOD: 41 MM/H (ref 0–20)
GFR SERPL CREATININE-BSD FRML MDRD: >90 ML/MIN/1.7M2
GLUCOSE SERPL-MCNC: 83 MG/DL (ref 70–99)
GLUCOSE UR STRIP-MCNC: NEGATIVE MG/DL
HCT VFR BLD AUTO: 31.9 % (ref 35–47)
HGB BLD-MCNC: 10.8 G/DL (ref 11.7–15.7)
HGB UR QL STRIP: NEGATIVE
KETONES UR STRIP-MCNC: NEGATIVE MG/DL
LEUKOCYTE ESTERASE UR QL STRIP: ABNORMAL
LYMPHOCYTES # BLD AUTO: 2 10E9/L (ref 0.8–5.3)
LYMPHOCYTES NFR BLD AUTO: 27.9 %
MCH RBC QN AUTO: 29.3 PG (ref 26.5–33)
MCHC RBC AUTO-ENTMCNC: 33.9 G/DL (ref 31.5–36.5)
MCV RBC AUTO: 87 FL (ref 78–100)
MONOCYTES # BLD AUTO: 0.7 10E9/L (ref 0–1.3)
MONOCYTES NFR BLD AUTO: 10.5 %
NEUTROPHILS # BLD AUTO: 4.3 10E9/L (ref 1.6–8.3)
NEUTROPHILS NFR BLD AUTO: 61 %
NITRATE UR QL: NEGATIVE
NON-SQ EPI CELLS #/AREA URNS LPF: ABNORMAL /LPF
PH UR STRIP: 6.5 PH (ref 5–7)
PLATELET # BLD AUTO: 226 10E9/L (ref 150–450)
POTASSIUM SERPL-SCNC: 3.9 MMOL/L (ref 3.4–5.3)
PROT SERPL-MCNC: 7.2 G/DL (ref 6.8–8.8)
PROT UR-MCNC: 0.07 G/L
PROT/CREAT 24H UR: 0.4 G/G CR (ref 0–0.2)
RBC # BLD AUTO: 3.68 10E12/L (ref 3.8–5.2)
RBC #/AREA URNS AUTO: ABNORMAL /HPF
SODIUM SERPL-SCNC: 137 MMOL/L (ref 133–144)
SOURCE: ABNORMAL
SP GR UR STRIP: 1.01 (ref 1–1.03)
URNS CMNT MICRO: ABNORMAL
UROBILINOGEN UR STRIP-ACNC: 0.2 EU/DL (ref 0.2–1)
WBC # BLD AUTO: 7 10E9/L (ref 4–11)
WBC #/AREA URNS AUTO: ABNORMAL /HPF

## 2018-01-03 PROCEDURE — 99213 OFFICE O/P EST LOW 20 MIN: CPT | Performed by: INTERNAL MEDICINE

## 2018-01-03 PROCEDURE — 84156 ASSAY OF PROTEIN URINE: CPT | Performed by: INTERNAL MEDICINE

## 2018-01-03 PROCEDURE — 86140 C-REACTIVE PROTEIN: CPT | Performed by: INTERNAL MEDICINE

## 2018-01-03 PROCEDURE — 85652 RBC SED RATE AUTOMATED: CPT | Performed by: INTERNAL MEDICINE

## 2018-01-03 PROCEDURE — 86225 DNA ANTIBODY NATIVE: CPT | Performed by: INTERNAL MEDICINE

## 2018-01-03 PROCEDURE — 81001 URINALYSIS AUTO W/SCOPE: CPT | Performed by: INTERNAL MEDICINE

## 2018-01-03 PROCEDURE — 85025 COMPLETE CBC W/AUTO DIFF WBC: CPT | Performed by: INTERNAL MEDICINE

## 2018-01-03 PROCEDURE — 82306 VITAMIN D 25 HYDROXY: CPT | Performed by: INTERNAL MEDICINE

## 2018-01-03 PROCEDURE — 82550 ASSAY OF CK (CPK): CPT | Performed by: INTERNAL MEDICINE

## 2018-01-03 PROCEDURE — 86160 COMPLEMENT ANTIGEN: CPT | Performed by: INTERNAL MEDICINE

## 2018-01-03 PROCEDURE — 87086 URINE CULTURE/COLONY COUNT: CPT | Performed by: INTERNAL MEDICINE

## 2018-01-03 PROCEDURE — 80053 COMPREHEN METABOLIC PANEL: CPT | Performed by: INTERNAL MEDICINE

## 2018-01-03 PROCEDURE — 36415 COLL VENOUS BLD VENIPUNCTURE: CPT | Performed by: INTERNAL MEDICINE

## 2018-01-03 RX ORDER — ASPIRIN 81 MG/1
81 TABLET, CHEWABLE ORAL DAILY
COMMUNITY

## 2018-01-03 NOTE — PROGRESS NOTES
Rheumatology Clinic Visit      Mireya Dewitt MRN# [id number]   YOB: 1980 Age: 37 year old      Date of visit: 18   Referring provider: Dr. Bruna Car  OB: [facility], located in the [facility]    Chief Complaint   Patient presents with:  RECHECK: patient states she is doing fine. Does have dry mouth not sure if it is due to winter or if she is getting worse    Assessment and Plan     1. Sjogren's Syndrome (THOMAS 1:[id number] speckled, SSA+, RF+; history of neutropenia, hx of parotid gland swelling): Negative RNP, Sm, SSB; [place] [facility] [place].  Currently without other symptoms to suggest lupus. Arthritis component is discussed in #2.    - Labs today: CBC, CMP, ESR, CRP, CK, C3, C4, dsDNA, UA, Uprotein:creatinine    2. Inflammatory arthritis:  Symptoms are consistent with an inflammatory arthritis that involve her PIPs bilaterally, but no synovitis on exam.  Pain is worse in the morning, and morning stiffness for most of the day; pain and stiffness improve with activity.  Suspect that the inflammatory arthritis is a component of Sjogren's syndrome.      3. Pregnancy in the setting of a positive SSA / Miscarriage History: She has a history of a 1st trimester miscarriage and in the setting of a positive THOMAS. With regard to the positive SSA, there is an increased risk for developing congenital heart block or  lupus; this requires increased fetal surveillance for heart block and she reports that her obstetrician is managing this already.  We reviewed the benefits of hydroxychloroquine and its association with a reduced rate of congenital heart block; she prefers to avoid hydroxychloroquine still.    Ms. Dewitt verbalized agreement with and understanding of the rational for the diagnosis and treatment plan.  All questions were answered to best of my ability and the patient's satisfaction. Ms. Dewitt was advised to contact the clinic with any  questions that may arise after the clinic visit.      Thank you for involving me in the care of the patient    Return to clinic: 2-3 months, sooner if needed      HPI   Mireya Dewitt is a 37 year old female with a past medical history significant for Sjogren's Syndrome who is seen in consultation at the request of Dr. Bruna Car for evaluation of Sjogren's Syndrome.    5/8/2017 [facility] rheumatology clinic note by Dr. Lenny Arriaga documents Sjogren's Syndrome and neutropenia.  Hospitalized for parotid swelling associated with dry eyes and mouth, polyarthralgias, and abnml serologies.  Since hospitalization, joint symptoms improved but morning stiffness for 4-6 hours.  On iron for microcytic anemia.  Symptoms started during her first pregnancy during 2003 with diffuse joint symptoms.  Dryness started in 2010. 2010 dx'd with IBS; at which time she had a high polyclonal IgG and elevated IgE. Anti-TTA ab negative.  12/2015 fetal loss early in pregnancy.  Proteinuria and thrombocytopenia during pregnancies.      Previously, Ms. Dewitt reported that over the years she has had parotid gland swelling that was successfully treated conservatively, but then she had an episode of painful parotid gland swelling that resulted in hospitalization.  Dry mouth for many years that she attributed to allergies and asthma.  Had a stye and saw Dr. Car that led to this visit and seeing Dr. Mock in ophthalmology.  Joint pain in her PIPs, worse in the morning and shoulder; better with activity.  Morning stiffness for most of the day.      Today, she reports the pregnancy is going well.  She is near the end of her second trimester.  She reports that the obstetrician group she is with has been watching her very closely and performing frequent fetal heart monitoring.  She says that with her previous pregnancy she had worsening joint pain after delivery so she feels more prepared for this time since she knows what to expect.  Overall doing  well.  Dry mouth and dry eyes that are tolerable and she does not want to do anything for them.    Denies fevers, chills, nausea, vomiting, constipation, diarrhea. No abdominal pain. No chest pain/pressure, palpitations, or shortness of breath. No LE swelling. No neck pain. No oral or nasal sores.  Periodically with itchy rash on neck and chest, but none now.  No photosensitivity or photophobia. No eye pain or redness. No history of inflammatory eye disease.  No history of DVT or pulmonary embolism; one 1st trimester miscarriage; two healthy daughters now.  No history of serositis.  No history of Raynaud's Phenomenon.  No seizure history.  No known renal disorder.      Tobacco: off and on, socially in the past but none now  EtOH: occasional, but none since she found out she is pregnant  Drugs: none  Occupation: cuts hair    ROS   GEN: No fevers, chills, night sweats, or weight change  SKIN: No itching, rashes, sores  HEENT: No oral or nasal ulcers.  CV: No chest pain, pressure, palpitations, or dyspnea on exertion.  PULM: No SOB, wheeze, cough.  GI: No nausea, vomiting, constipation, diarrhea. No blood in stool. No abdominal pain.  : No blood in urine.  MSK: See HPI.  NEURO: No numbness, tingling, or weakness.  EXT: No LE swelling  PSYCH: Negative    Active Problem List     Patient Active Problem List   Diagnosis     Sjogren's syndrome (H)     Inflammatory polyarthropathy (H)     Past Medical History     Past Medical History:   Diagnosis Date     Inflammatory polyarthropathy (H) 10/25/2017     Parotitis not due to mumps 4/9/2017     Severe pre-eclampsia 8/26/2016     Past Surgical History   History reviewed. No pertinent surgical history.  Allergy   No Known Allergies  Current Medication List     Current Outpatient Prescriptions   Medication Sig     aspirin (ASPIRIN 81) 81 MG chewable tablet Take 81 mg by mouth daily     Prenatal Vit-Fe Fumarate-FA (PRENATAL MULTIVITAMIN PLUS IRON) 27-0.8 MG TABS per tablet Take 1  "tablet by mouth daily     DISEASE-MODIFYING ANTIRHEUMATIC DRUG NOT PRESCRIBED, INTENTIONAL, Take 1 Units by mouth once     No current facility-administered medications for this visit.          Social History   See HPI    Family History     Family History   Problem Relation Age of Onset     DIABETES No family hx of      Maternal aunt and her daughter: celiac  Maternal grandmother's sister's grandchild: celiac    Physical Exam     Temp Readings from Last 3 Encounters:   10/25/17 97.6  F (36.4  C)   07/13/17 97.5  F (36.4  C) (Oral)     BP Readings from Last 5 Encounters:   01/03/18 108/58   10/25/17 111/57   07/13/17 116/68     Pulse Readings from Last 1 Encounters:   01/03/18 95     Resp Readings from Last 1 Encounters:   10/25/17 14     Estimated body mass index is 23.45 kg/(m^2) as calculated from the following:    Height as of this encounter: 1.626 m (5' 4\").    Weight as of this encounter: 62 kg (136 lb 9.6 oz).    GEN: NAD  HEENT: Dry mucous membranes. No oral lesions. Anicteric, noninjected sclera. Eyes appeared dry by gross examination   CV: S1, S2. RRR. No m/r/g.  PULM: CTA bilaterally. No w/c.  ABD: Larger abdomen consistent with pregnancy  MSK: MCPs, PIPs, wrists, elbows, shoulders, knees, ankles, and MTPs without swelling or tenderness to palpation. Hips nontender to direct palpation.   NEURO: UE and LE strengths 5/5 and equal bilaterally.   SKIN: No rash  EXT: No LE edema  PSYCH: Alert. Appropriate.    Labs / Imaging (select studies)     RF/CCP  Recent Labs   Lab Test  10/25/17   1012   CCPIGG  1     dsDNA  Recent Labs   Lab Test  10/25/17   1012   DNA  1     C3/C4  Recent Labs   Lab Test  10/25/17   1012   [id number]  86   [id number]  16     Antiphospholipid Antibodies  Recent Labs   Lab Test  10/25/17   1012   B2GPG  0.8   B2GPM  1.1   CARDG  <1.6   CARDM  <0.2   LUPINT  Negative     IgG  Recent Labs   Lab Test  05/03/10   1150   IGG  2240*   IGA  412*   IGM  90     CBC  Recent Labs   Lab Test  10/25/17   " 1012  06/16/10   0926  05/03/10   1150   WBC  4.8  3.9*  4.3   RBC  3.86   --   4.47   HGB  11.0*   --   12.6   HCT  32.2*   --   37.9   MCV  83   --   85   RDW  13.5   --   13.0   PLT  208   --   213   MCH  28.5   --   28.2   MCHC  34.2   --   33.2   NEUTROPHIL  61.3  45  52   LYMPH  27.9  38  35   MONOCYTE  10.4  11  6   EOSINOPHIL  0.4  6  7*   BASOPHIL  0.0  0  0   ANEU  2.9  1.8  2.3   ALYM  1.3  1.5  1.5   CAROLINA  0.5  0.4  0.3   AEOS  0.0  0.2  0.3   ABAS  0.0  0.0  0.0     CMP  Recent Labs   Lab Test  10/25/17   1012   NA  136   POTASSIUM  3.9   CHLORIDE  107   CO2  23   ANIONGAP  6   GLC  78   BUN  7   CR  0.37*   GFRESTIMATED  >90   GFRESTBLACK  >90   YONI  8.4*   BILITOTAL  0.4   ALBUMIN  3.1*   PROTTOTAL  7.6   ALKPHOS  60   AST  17   ALT  23     Calcium/VitaminD  Recent Labs   Lab Test  10/25/17   1012   YONI  8.4*     ESR/CRP  Recent Labs   Lab Test  10/25/17   1012   SED  35*   CRP  <2.9     CK/Aldolase  Recent Labs   Lab Test  10/25/17   1012   CKT  24*     UA  Recent Labs   Lab Test  10/25/17   1003   COLOR  Yellow   APPEARANCE  Slightly Cloudy   URINEGLC  Negative   URINEBILI  Negative   SG  <=1.005   URINEPH  6.5   PROTEIN  Negative   UROBILINOGEN  0.2   NITRITE  Negative   UBLD  Negative   LEUKEST  Small*   WBCU  2-5*   RBCU  O - 2   SQUAMOUSEPI  Few     Urine Microscopic  Recent Labs   Lab Test  10/25/17   1003   WBCU  2-5*   RBCU  O - 2   SQUAMOUSEPI  Few     Urine Protein  Recent Labs   Lab Test  10/25/17   1003   UTP  <0.05   UTPG  Unable to calculate due to low value   UCRR  20     Immunization History     Immunization History   Administered Date(s) Administered     Tetanus 07/28/2016          Chart documentation done in part with Dragon Voice recognition Software. Although reviewed after completion, some word and grammatical error may remain.    Ravi Antonio MD

## 2018-01-03 NOTE — NURSING NOTE
"Chief Complaint   Patient presents with     RECHECK     patient states she is doing fine. Does have dry mouth not sure if it is due to winter or if she is getting worse       Initial /58 (BP Location: Left arm, Patient Position: Chair, Cuff Size: Adult Regular)  Pulse 95  Ht 1.626 m (5' 4\")  Wt 62 kg (136 lb 9.6 oz)  SpO2 99%  BMI 23.45 kg/m2 Estimated body mass index is 23.45 kg/(m^2) as calculated from the following:    Height as of this encounter: 1.626 m (5' 4\").    Weight as of this encounter: 62 kg (136 lb 9.6 oz).  BP completed using cuff size: regular         RAPID3 (0-30) Cumulative Score  10.7          RAPID3 Weighted Score (divide #4 by 3 and that is the weighted score)  3.1         "

## 2018-01-03 NOTE — MR AVS SNAPSHOT
After Visit Summary   1/3/2018    Mireya Dewitt    MRN: [id number]           Patient Information     Date Of Birth          1980        Visit Information        Provider Department      1/3/2018 11:40 AM Ravi Antonio MD [facility]        Today's Diagnoses     Sjogren's syndrome with keratoconjunctivitis sicca (H)    -  1       Follow-ups after your visit        Your next 10 appointments already scheduled     Mar 28, 2018 10:15 AM CDT   LAB with  LAB   [facility]    [address] [address]   [phone]           Please do not eat 10-12 hours before your appointment if you are coming in fasting for labs on lipids, cholesterol, or glucose (sugar). This does not apply to pregnant women. Water, hot tea and black coffee (with nothing added) are okay. Do not drink other fluids, diet soda or chew gum.            Apr 05, 2018  3:20 PM CDT   Return Visit with Ravi Antonio MD   [facility]    [address]  [place] [address]   [phone]              Future tests that were ordered for you today     Open Future Orders        Priority Expected Expires Ordered    Complement C4 Routine 3/27/2018 4/20/2018 1/3/2018    Erythrocyte sedimentation rate auto Routine 3/27/2018 4/20/2018 1/3/2018    UA with Microscopic reflex to Culture Routine 3/27/2018 4/20/2018 1/3/2018    Protein  random urine with Creat Ratio Routine 3/27/2018 4/20/2018 1/3/2018    CBC with platelets differential Routine 3/27/2018 4/20/2018 1/3/2018    Complement C3 Routine 3/27/2018 4/20/2018 1/3/2018    CK total Routine 3/27/2018 4/20/2018 1/3/2018    DNA double stranded antibodies Routine 3/27/2018 4/20/2018 1/3/2018    CRP inflammation Routine 3/27/2018 4/20/2018 1/3/2018    Comprehensive metabolic panel Routine 3/27/2018 4/20/2018 1/3/2018            Who to contact     If you have questions or need follow  "up information about today's clinic visit or your schedule please contact [facility] [place] directly at [phone].  Normal or non-critical lab and imaging results will be communicated to you by MyChart, letter or phone within 4 business days after the clinic has received the results. If you do not hear from us within 7 days, please contact the clinic through [employer] or phone. If you have a critical or abnormal lab result, we will notify you by phone as soon as possible.  Submit refill requests through [employer] or call your pharmacy and they will forward the refill request to us. Please allow 3 business days for your refill to be completed.          Additional Information About Your Visit        [employer] [employer] Information     [employer] lets you send messages to your doctor, view your test results, renew your prescriptions, schedule appointments and more. To sign up, go to [email or web address] . Click on \"Log in\" on the left side of the screen, which will take you to the Welcome page. Then click on \"Sign up Now\" on the right side of the page.     You will be asked to enter the access code listed below, as well as some personal information. Please follow the directions to create your username and password.     Your access code is: [email or web address]  Expires: 2018 12:42 PM     Your access code will  in 90 days. If you need help or a new code, please call your [place] clinic or [phone].        Care EveryWhere ID     This is your Care EveryWhere ID. This could be used by other organizations to access your [place] medical records  [id number]        Your Vitals Were     Pulse Height Pulse Oximetry BMI (Body Mass Index)          95 1.626 m (5' 4\") 99% 23.45 kg/m2         Blood Pressure from Last 3 Encounters:   18 108/58   10/25/17 111/57   17 116/68    Weight from Last 3 Encounters:   18 62 kg (136 lb 9.6 oz)   10/25/17 56.7 kg (125 lb)   17 56.7 kg (125 lb)              We " Performed the Following     CBC with platelets differential     CK total     Complement C3     Complement C4     Comprehensive metabolic panel     CRP inflammation     DNA double stranded antibodies     Erythrocyte sedimentation rate auto     Protein  random urine with Creat Ratio     UA with Microscopic reflex to Culture     Vitamin D Deficiency        Primary Care Provider Office Phone # Fax #    [facility] [phone] [address]  [place] [address]        Equal Access to Services     MAINE REILLY : Hadii aad ku hadasho Soomaali, waaxda luqadaha, qaybta kaalmada adeegyada, waxay idiin hayaan adeeg khmarlenysh la'aan ah. So [facility] [phone].    ATENCIÓN: Si habla español, tiene a arevalo disposición servicios gratuitos de asistencia lingüística. [facility] [phone].    We comply with applicable federal civil rights laws and [place] laws. We do not discriminate on the basis of race, color, national origin, age, disability, sex, sexual orientation, or gender identity.            Thank you!     Thank you for choosing [facility]  for your care. Our goal is always to provide you with excellent care. Hearing back from our patients is one way we can continue to improve our services. Please take a few minutes to complete the written survey that you may receive in the mail after your visit with us. Thank you!             Your Updated Medication List - Protect others around you: Learn how to safely use, store and throw away your medicines at www.disposemymeds.org.          This list is accurate as of: 1/3/18 12:19 PM.  Always use your most recent med list.                   Brand Name Dispense Instructions for use Diagnosis    ASPIRIN 81 81 MG chewable tablet   Generic drug:  aspirin      Take 81 mg by mouth daily        DISEASE-MODIFYING ANTIRHEUMATIC DRUG NOT PRESCRIBED (INTENTIONAL)     1 each    Take 1 Units by mouth once        prenatal multivitamin plus iron 27-0.8 MG  Tabs per tablet      Take 1 tablet by mouth daily

## 2018-01-04 LAB
BACTERIA SPEC CULT: NO GROWTH
C3 SERPL-MCNC: 107 MG/DL (ref 76–169)
C4 SERPL-MCNC: 16 MG/DL (ref 15–50)
DEPRECATED CALCIDIOL+CALCIFEROL SERPL-MC: 14 UG/L (ref 20–75)
DSDNA AB SER-ACNC: 1 IU/ML
SPECIMEN SOURCE: NORMAL

## 2018-01-09 DIAGNOSIS — M35.01 SJOGREN'S SYNDROME WITH KERATOCONJUNCTIVITIS SICCA (H): ICD-10-CM

## 2018-01-09 DIAGNOSIS — E55.9 VITAMIN D DEFICIENCY: Primary | ICD-10-CM

## 2018-01-09 NOTE — PROGRESS NOTES
Rheumatology team: Please call to notify Ms. Dewitt that her labs are fairly stable, but the vitamin D was low and therefore I have prescribed vitamin D 1000 international units daily, and urine protein is slightly elevated; the urine protein measurement should be repeated within the next week at a [facility] lab.  Please advise her to be well hydrated prior to the urine test.  Please advise her that this lab is only for a urine test.      Ravi Antonoi MD  1/9/2018 6:15 AM

## 2018-03-28 DIAGNOSIS — M35.01 SJOGREN'S SYNDROME WITH KERATOCONJUNCTIVITIS SICCA (H): ICD-10-CM

## 2018-03-28 LAB
ALBUMIN SERPL-MCNC: 2.6 G/DL (ref 3.4–5)
ALBUMIN UR-MCNC: NEGATIVE MG/DL
ALP SERPL-CCNC: 149 U/L (ref 40–150)
ALT SERPL W P-5'-P-CCNC: 15 U/L (ref 0–50)
ANION GAP SERPL CALCULATED.3IONS-SCNC: 9 MMOL/L (ref 3–14)
APPEARANCE UR: CLEAR
AST SERPL W P-5'-P-CCNC: 20 U/L (ref 0–45)
BASOPHILS # BLD AUTO: 0 10E9/L (ref 0–0.2)
BASOPHILS NFR BLD AUTO: 0.1 %
BILIRUB SERPL-MCNC: 0.3 MG/DL (ref 0.2–1.3)
BILIRUB UR QL STRIP: NEGATIVE
BUN SERPL-MCNC: 4 MG/DL (ref 7–30)
C3 SERPL-MCNC: 118 MG/DL (ref 76–169)
C4 SERPL-MCNC: 18 MG/DL (ref 15–50)
CALCIUM SERPL-MCNC: 8.3 MG/DL (ref 8.5–10.1)
CHLORIDE SERPL-SCNC: 107 MMOL/L (ref 94–109)
CK SERPL-CCNC: 52 U/L (ref 30–225)
CO2 SERPL-SCNC: 22 MMOL/L (ref 20–32)
COLOR UR AUTO: YELLOW
CREAT SERPL-MCNC: 0.49 MG/DL (ref 0.52–1.04)
CREAT UR-MCNC: 22 MG/DL
CRP SERPL-MCNC: <2.9 MG/L (ref 0–8)
DIFFERENTIAL METHOD BLD: ABNORMAL
EOSINOPHIL # BLD AUTO: 0.1 10E9/L (ref 0–0.7)
EOSINOPHIL NFR BLD AUTO: 0.7 %
ERYTHROCYTE [DISTWIDTH] IN BLOOD BY AUTOMATED COUNT: 15.4 % (ref 10–15)
ERYTHROCYTE [SEDIMENTATION RATE] IN BLOOD BY WESTERGREN METHOD: 35 MM/H (ref 0–20)
GFR SERPL CREATININE-BSD FRML MDRD: >90 ML/MIN/1.7M2
GLUCOSE SERPL-MCNC: 92 MG/DL (ref 70–99)
GLUCOSE UR STRIP-MCNC: NEGATIVE MG/DL
HCT VFR BLD AUTO: 35.7 % (ref 35–47)
HGB BLD-MCNC: 11.7 G/DL (ref 11.7–15.7)
HGB UR QL STRIP: NEGATIVE
KETONES UR STRIP-MCNC: NEGATIVE MG/DL
LEUKOCYTE ESTERASE UR QL STRIP: ABNORMAL
LYMPHOCYTES # BLD AUTO: 1.5 10E9/L (ref 0.8–5.3)
LYMPHOCYTES NFR BLD AUTO: 20.7 %
MCH RBC QN AUTO: 28 PG (ref 26.5–33)
MCHC RBC AUTO-ENTMCNC: 32.8 G/DL (ref 31.5–36.5)
MCV RBC AUTO: 85 FL (ref 78–100)
MONOCYTES # BLD AUTO: 0.5 10E9/L (ref 0–1.3)
MONOCYTES NFR BLD AUTO: 7.5 %
NEUTROPHILS # BLD AUTO: 5 10E9/L (ref 1.6–8.3)
NEUTROPHILS NFR BLD AUTO: 71 %
NITRATE UR QL: NEGATIVE
NON-SQ EPI CELLS #/AREA URNS LPF: ABNORMAL /LPF
PH UR STRIP: 6 PH (ref 5–7)
PLATELET # BLD AUTO: 189 10E9/L (ref 150–450)
POTASSIUM SERPL-SCNC: 3.8 MMOL/L (ref 3.4–5.3)
PROT SERPL-MCNC: 7.2 G/DL (ref 6.8–8.8)
PROT UR-MCNC: 0.05 G/L
PROT/CREAT 24H UR: 0.23 G/G CR (ref 0–0.2)
RBC # BLD AUTO: 4.18 10E12/L (ref 3.8–5.2)
RBC #/AREA URNS AUTO: ABNORMAL /HPF
SODIUM SERPL-SCNC: 138 MMOL/L (ref 133–144)
SOURCE: ABNORMAL
SP GR UR STRIP: <=1.005 (ref 1–1.03)
UROBILINOGEN UR STRIP-ACNC: 0.2 EU/DL (ref 0.2–1)
WBC # BLD AUTO: 7.1 10E9/L (ref 4–11)
WBC #/AREA URNS AUTO: ABNORMAL /HPF

## 2018-03-28 PROCEDURE — 84156 ASSAY OF PROTEIN URINE: CPT | Performed by: INTERNAL MEDICINE

## 2018-03-28 PROCEDURE — 85652 RBC SED RATE AUTOMATED: CPT | Performed by: INTERNAL MEDICINE

## 2018-03-28 PROCEDURE — 86160 COMPLEMENT ANTIGEN: CPT | Performed by: INTERNAL MEDICINE

## 2018-03-28 PROCEDURE — 86225 DNA ANTIBODY NATIVE: CPT | Performed by: INTERNAL MEDICINE

## 2018-03-28 PROCEDURE — 85025 COMPLETE CBC W/AUTO DIFF WBC: CPT | Performed by: INTERNAL MEDICINE

## 2018-03-28 PROCEDURE — 86140 C-REACTIVE PROTEIN: CPT | Performed by: INTERNAL MEDICINE

## 2018-03-28 PROCEDURE — 81001 URINALYSIS AUTO W/SCOPE: CPT | Performed by: INTERNAL MEDICINE

## 2018-03-28 PROCEDURE — 80053 COMPREHEN METABOLIC PANEL: CPT | Performed by: INTERNAL MEDICINE

## 2018-03-28 PROCEDURE — 82550 ASSAY OF CK (CPK): CPT | Performed by: INTERNAL MEDICINE

## 2018-03-28 PROCEDURE — 36415 COLL VENOUS BLD VENIPUNCTURE: CPT | Performed by: INTERNAL MEDICINE

## 2018-03-29 LAB — DSDNA AB SER-ACNC: 1 IU/ML

## 2018-04-05 ENCOUNTER — OFFICE VISIT (OUTPATIENT)
Dept: RHEUMATOLOGY | Facility: CLINIC | Age: 38
End: 2018-04-05
Payer: COMMERCIAL

## 2018-04-05 VITALS
HEART RATE: 80 BPM | WEIGHT: 152.8 LBS | SYSTOLIC BLOOD PRESSURE: 119 MMHG | OXYGEN SATURATION: 96 % | BODY MASS INDEX: 26.09 KG/M2 | HEIGHT: 64 IN | RESPIRATION RATE: 14 BRPM | DIASTOLIC BLOOD PRESSURE: 69 MMHG

## 2018-04-05 DIAGNOSIS — M35.01 SJOGREN'S SYNDROME WITH KERATOCONJUNCTIVITIS SICCA (H): Primary | ICD-10-CM

## 2018-04-05 DIAGNOSIS — E55.9 VITAMIN D DEFICIENCY: ICD-10-CM

## 2018-04-05 PROCEDURE — 99213 OFFICE O/P EST LOW 20 MIN: CPT | Performed by: INTERNAL MEDICINE

## 2018-04-05 NOTE — MR AVS SNAPSHOT
After Visit Summary   4/5/2018    Mireya Dewitt    MRN: [id number]           Patient Information     Date Of Birth          1980        Visit Information        Provider Department      4/5/2018 3:20 PM Ravi Antonio MD [facility]        Today's Diagnoses     Sjogren's syndrome with keratoconjunctivitis sicca (H)    -  1    Vitamin D deficiency           Follow-ups after your visit        Your next 10 appointments already scheduled     Aug 02, 2018  3:30 PM CDT   LAB with  LAB   [facility]    [address] [address]   [phone]           Please do not eat 10-12 hours before your appointment if you are coming in fasting for labs on lipids, cholesterol, or glucose (sugar). This does not apply to pregnant women. Water, hot tea and black coffee (with nothing added) are okay. Do not drink other fluids, diet soda or chew gum.            Aug 09, 2018  3:00 PM CDT   [facility]   [facility]    [address] [address]   [phone]              Future tests that were ordered for you today     Open Future Orders        Priority Expected Expires Ordered    CBC with platelets differential Routine 7/5/2018 8/24/2018 4/5/2018    CK total Routine 7/5/2018 8/24/2018 4/5/2018    DNA double stranded antibodies Routine 7/5/2018 8/24/2018 4/5/2018    Comprehensive metabolic panel Routine 7/5/2018 8/24/2018 4/5/2018    Complement C4 Routine 7/5/2018 8/24/2018 4/5/2018    UA reflex to Microscopic and Culture Routine 7/5/2018 8/24/2018 4/5/2018    Protein  random urine with Creat Ratio Routine 7/5/2018 8/24/2018 4/5/2018    Erythrocyte sedimentation rate auto Routine 7/5/2018 8/24/2018 4/5/2018    CRP inflammation Routine 7/5/2018 8/24/2018 4/5/2018    Complement C3 Routine 7/5/2018 8/24/2018 4/5/2018    Vitamin D Deficiency Routine 7/5/2018 8/24/2018  "2018            Who to contact     If you have questions or need follow up information about today's clinic visit or your schedule please contact [facility] [place] directly at [phone].  Normal or non-critical lab and imaging results will be communicated to you by MyChart, letter or phone within 4 business days after the clinic has received the results. If you do not hear from us within 7 days, please contact the clinic through MyChart or phone. If you have a critical or abnormal lab result, we will notify you by phone as soon as possible.  Submit refill requests through [employer] or call your pharmacy and they will forward the refill request to us. Please allow 3 business days for your refill to be completed.          Additional Information About Your Visit        [facility] Information     [employer] lets you send messages to your doctor, view your test results, renew your prescriptions, schedule appointments and more. To sign up, go to [email or web address] . Click on \"Log in\" on the left side of the screen, which will take you to the Welcome page. Then click on \"Sign up Now\" on the right side of the page.     You will be asked to enter the access code listed below, as well as some personal information. Please follow the directions to create your username and password.     Your access code is: [email or web address]  Expires: 2018  3:48 PM     Your access code will  in 90 days. If you need help or a new code, please call your [place] clinic or [phone].        Care EveryWhere ID     This is your Care EveryWhere ID. This could be used by other organizations to access your [place] medical records  [id number]        Your Vitals Were     Pulse Respirations Height Pulse Oximetry BMI (Body Mass Index)       80 14 1.626 m (5' 4\") 96% 26.23 kg/m2        Blood Pressure from Last 3 Encounters:   18 119/69   18 108/58   10/25/17 111/57    Weight from Last 3 Encounters:   18 69.3 kg (152 lb " 12.8 oz)   01/03/18 62 kg (136 lb 9.6 oz)   10/25/17 56.7 kg (125 lb)               Primary Care Provider Office Phone # Fax #    [facility] [phone] [address] [address]        Equal Access to Services     KIRK REILLY : Hadii aad ku hadasho Soomaali, waaxda luqadaha, qaybta kaalmada adeegyada, waxay idiin hayaan adeeg khmarlenyyoseph lalibian . So [facility] [phone].    ATENCIÓN: Si habla español, tiene a arevalo disposición servicios gratuitos de asistencia lingüística. Llame al [phone].    We comply with applicable federal civil rights laws and [place] laws. We do not discriminate on the basis of race, color, national origin, age, disability, sex, sexual orientation, or gender identity.            Thank you!     Thank you for choosing [facility]  for your care. Our goal is always to provide you with excellent care. Hearing back from our patients is one way we can continue to improve our services. Please take a few minutes to complete the written survey that you may receive in the mail after your visit with us. Thank you!             Your Updated Medication List - Protect others around you: Learn how to safely use, store and throw away your medicines at www.disposemymeds.org.          This list is accurate as of 4/5/18  3:48 PM.  Always use your most recent med list.                   Brand Name Dispense Instructions for use Diagnosis    ASPIRIN 81 81 MG chewable tablet   Generic drug:  aspirin      Take 81 mg by mouth daily        cholecalciferol 1000 UNIT tablet    vitamin D3    100 tablet    Take 1 tablet (1,000 Units) by mouth daily    Vitamin D deficiency       DISEASE-MODIFYING ANTIRHEUMATIC DRUG NOT PRESCRIBED (INTENTIONAL)     1 each    Take 1 Units by mouth once        prenatal multivitamin plus iron 27-0.8 MG Tabs per tablet      Take 1 tablet by mouth daily

## 2018-04-05 NOTE — PROGRESS NOTES
Rheumatology Clinic Visit      Mireya Dewitt MRN# [id number]   YOB: 1980 Age: 37 year old      Date of visit: 4/05/18   Referring provider: Dr. Bruna Car  OB: [facility], located in the [facility]    Chief Complaint   Patient presents with:  RECHECK: Patient states she is doing ok, some days she has pain.    Assessment and Plan     1. Sjogren's Syndrome (THOMAS 1:86653 speckled, SSA+, RF+; history of neutropenia, hx of parotid gland swelling): Negative RNP, Sm, SSB; labs were done at [facility] in April and May 2017.  Currently without other symptoms to suggest lupus. Arthritis component is discussed in #2.    - Labs 1 week prior to the next rheumatology clinic visit: CBC, CMP, ESR, CRP, CK, C3, C4, dsDNA, UA, Uprotein:creatinine    2. Inflammatory arthritis:  Symptoms are consistent with an inflammatory arthritis that involve her PIPs bilaterally, but no synovitis on exam.  Pain is worse in the morning, and morning stiffness for most of the day; pain and stiffness improve with activity.  Suspect that the inflammatory arthritis is a component of Sjogren's syndrome.      3. Pregnancy in the setting of a positive SSA / Miscarriage History: See previous notes; she did not want to be on hydroxychloroquine during pregnancy.     4. Vitamin D Deficiency: only took for 1 month.   - Restart vitamin D 1000 IU daily  - Lab 1 week prior to f/u: vitamin D    Ms. Dewitt verbalized agreement with and understanding of the rational for the diagnosis and treatment plan.  All questions were answered to best of my ability and the patient's satisfaction. Ms. Dewitt was advised to contact the clinic with any questions that may arise after the clinic visit.      Thank you for involving me in the care of the patient    Return to clinic: 3-4 months, sooner if needed      HPI   Mireya Dewitt is a 37 year old female with a past medical history significant for Sjogren's Syndrome who is seen for f/u  Sjogren's Syndrome.    5/8/2017 [facility] rheumatology clinic note by Dr. Lenny Arriaga documents Sjogren's Syndrome and neutropenia.  Hospitalized for parotid swelling associated with dry eyes and mouth, polyarthralgias, and abnml serologies.  Since hospitalization, joint symptoms improved but morning stiffness for 4-6 hours.  On iron for microcytic anemia.  Symptoms started during her first pregnancy during 2003 with diffuse joint symptoms.  Dryness started in 2010. 2010 dx'd with IBS; at which time she had a high polyclonal IgG and elevated IgE. Anti-TTA ab negative.  12/2015 fetal loss early in pregnancy.  Proteinuria and thrombocytopenia during pregnancies.      Previously, Ms. Dewitt reported that over the years she has had parotid gland swelling that was successfully treated conservatively, but then she had an episode of painful parotid gland swelling that resulted in hospitalization.  Dry mouth for many years that she attributed to allergies and asthma.  Had a stye and saw Dr. Car that led to this visit and seeing Dr. Mock in ophthalmology.  Joint pain in her PIPs, worse in the morning and shoulder; better with activity.  Morning stiffness for most of the day.      Today, she reports that she is doing well.  Aches and pains worse last week that she associated with pregnancy and improved by now already.  Dry mouth and dry eyes that are tolerable and she does not want to do anything for them right now.  Anticipate soon delivery.     Denies fevers, chills, nausea, vomiting, constipation, diarrhea. No abdominal pain. No chest pain/pressure, palpitations, or shortness of breath. No LE swelling. No neck pain. No oral or nasal sores.  Periodically with itchy rash on neck and chest, but none now.  No photosensitivity or photophobia. No eye pain or redness. No history of inflammatory eye disease.  No history of DVT or pulmonary embolism; one 1st trimester miscarriage; two healthy daughters now.  No history of  serositis.  No history of Raynaud's Phenomenon.  No seizure history.  No known renal disorder.      Tobacco: off and on, socially in the past but none now  EtOH: occasional, but none since she found out she is pregnant  Drugs: none  Occupation: cuts hair    ROS   GEN: No fevers, chills, night sweats, or weight change  SKIN: No itching, rashes, sores  HEENT: No oral or nasal ulcers.  CV: No chest pain, pressure, palpitations, or dyspnea on exertion.  PULM: No SOB, wheeze, cough.  GI: No nausea, vomiting, constipation, diarrhea. No blood in stool. No abdominal pain.  : No blood in urine.  MSK: See HPI.  NEURO: No numbness or tingling  EXT: No LE swelling  PSYCH: Negative    Active Problem List     Patient Active Problem List   Diagnosis     Sjogren's syndrome (H)     Inflammatory polyarthropathy (H)     Vitamin D deficiency     Past Medical History     Past Medical History:   Diagnosis Date     Inflammatory polyarthropathy (H) 10/25/2017     Parotitis not due to mumps 4/9/2017     Severe pre-eclampsia 8/26/2016     Past Surgical History   History reviewed. No pertinent surgical history.  Allergy   No Known Allergies  Current Medication List     Current Outpatient Prescriptions   Medication Sig     Prenatal Vit-Fe Fumarate-FA (PRENATAL MULTIVITAMIN PLUS IRON) 27-0.8 MG TABS per tablet Take 1 tablet by mouth daily     cholecalciferol (VITAMIN D3) 1000 UNIT tablet Take 1 tablet (1,000 Units) by mouth daily (Patient not taking: Reported on 4/5/2018)     aspirin (ASPIRIN 81) 81 MG chewable tablet Take 81 mg by mouth daily     DISEASE-MODIFYING ANTIRHEUMATIC DRUG NOT PRESCRIBED, INTENTIONAL, Take 1 Units by mouth once     No current facility-administered medications for this visit.          Social History   See HPI    Family History     Family History   Problem Relation Age of Onset     DIABETES No family hx of      Maternal aunt and her daughter: celiac  Maternal grandmother's sister's grandchild: celiac    Physical Exam  "    Temp Readings from Last 3 Encounters:   10/25/17 97.6  F (36.4  C)   07/13/17 97.5  F (36.4  C) (Oral)     BP Readings from Last 5 Encounters:   04/05/18 119/69   01/03/18 108/58   10/25/17 111/57   07/13/17 116/68     Pulse Readings from Last 1 Encounters:   04/05/18 80     Resp Readings from Last 1 Encounters:   04/05/18 14     Estimated body mass index is 26.23 kg/(m^2) as calculated from the following:    Height as of this encounter: 1.626 m (5' 4\").    Weight as of this encounter: 69.3 kg (152 lb 12.8 oz).    GEN: NAD  HEENT: Dry mucous membranes. No oral lesions. Anicteric, noninjected sclera. Eyes appeared dry by gross examination   CV: S1, S2. RRR. No m/r/g.  PULM: CTA bilaterally. No w/c.  ABD: Abdomen consistent with pregnancy  MSK: MCPs, PIPs, wrists, elbows, shoulders, knees, ankles, and MTPs without swelling or tenderness to palpation. Hips nontender to direct palpation.   NEURO: UE and LE strengths 5/5 and equal bilaterally.   SKIN: No rash  EXT: No LE edema  PSYCH: Alert. Appropriate.    Labs / Imaging (select studies)   RF/CCP  Recent Labs   Lab Test  10/25/17   1012   CCPIGG  1     dsDNA  Recent Labs   Lab Test  03/28/18   1018  01/03/18   1237  10/25/17   1012   DNA  1  1  1     C3/C4  Recent Labs   Lab Test  03/28/18   1018  01/03/18   1237  10/25/17   1012   [id number]  118  107  86   [id number]  18  16  16       Antiphospholipid Antibodies  Recent Labs   Lab Test  10/25/17   1012   B2GPG  0.8   B2GPM  1.1   CARDG  <1.6   CARDM  <0.2   LUPINT  Negative     IgG  Recent Labs   Lab Test  05/03/10   1150   IGG  2240*   IGA  412*   IGM  90     CBC  Recent Labs   Lab Test  03/28/18   1018  01/03/18   1237  10/25/17   1012   WBC  7.1  7.0  4.8   RBC  4.18  3.68*  3.86   HGB  11.7  10.8*  11.0*   HCT  35.7  31.9*  32.2*   MCV  85  87  83   RDW  15.4*  13.8  13.5   PLT  189  226  208   MCH  28.0  29.3  28.5   MCHC  32.8  33.9  34.2   NEUTROPHIL  71.0  61.0  61.3   LYMPH  20.7  27.9  27.9   MONOCYTE  7.5  " 10.5  10.4   EOSINOPHIL  0.7  0.6  0.4   BASOPHIL  0.1  0.0  0.0   ANEU  5.0  4.3  2.9   ALYM  1.5  2.0  1.3   CAROLINA  0.5  0.7  0.5   AEOS  0.1  0.0  0.0   ABAS  0.0  0.0  0.0     CMP  Recent Labs   Lab Test  03/28/18   1018  01/03/18   1237  10/25/17   1012   NA  138  137  136   POTASSIUM  3.8  3.9  3.9   CHLORIDE  107  106  107   CO2  22  23  23   ANIONGAP  9  8  6   GLC  92  83  78   BUN  4*  8  7   CR  0.49*  0.46*  0.37*   GFRESTIMATED  >90  >90  >90   GFRESTBLACK  >90  >90  >90   YONI  8.3*  8.3*  8.4*   BILITOTAL  0.3  0.2  0.4   ALBUMIN  2.6*  2.6*  3.1*   PROTTOTAL  7.2  7.2  7.6   ALKPHOS  149  68  60   AST  20  19  17   ALT  15  16  23     Calcium/VitaminD  Recent Labs   Lab Test  03/28/18   1018  01/03/18   1237  10/25/17   1012   YONI  8.3*  8.3*  8.4*   VITDT   --   14*   --      ESR/CRP  Recent Labs   Lab Test  03/28/18   1018  01/03/18   1237  10/25/17   1012   SED  35*  41*  35*   CRP  <2.9  <2.9  <2.9     CK/Aldolase  Recent Labs   Lab Test  03/28/18   1018  01/03/18   1237  10/25/17   1012   CKT  52  33  24*     UA  Recent Labs   Lab Test  03/28/18   1026  01/03/18   1219  10/25/17   1003   COLOR  Yellow  Yellow  Yellow   APPEARANCE  Clear  Clear  Slightly Cloudy   URINEGLC  Negative  Negative  Negative   URINEBILI  Negative  Negative  Negative   SG  <=1.005  1.010  <=1.005   URINEPH  6.0  6.5  6.5   PROTEIN  Negative  Negative  Negative   UROBILINOGEN  0.2  0.2  0.2   NITRITE  Negative  Negative  Negative   UBLD  Negative  Negative  Negative   LEUKEST  Trace*  Large*  Small*   WBCU  0 - 5  O - 2  2-5*   RBCU  O - 2  O - 2  O - 2   SQUAMOUSEPI  Few  Few  Few   BACTERIA   --   Few*   --      Urine Microscopic  Recent Labs   Lab Test  03/28/18   1026  01/03/18   1219  10/25/17   1003   WBCU  0 - 5  O - 2  2-5*   RBCU  O - 2  O - 2  O - 2   SQUAMOUSEPI  Few  Few  Few   BACTERIA   --   Few*   --      Urine Protein  Recent Labs   Lab Test  03/28/18   1026  01/03/18   1219  10/25/17   1003   UTP  0.05   0.07  <0.05   UTPG  0.23*  0.40*  Unable to calculate due to low value   UCRR  22   --   20     Immunization History     Immunization History   Administered Date(s) Administered     Tetanus 07/28/2016          Chart documentation done in part with Dragon Voice recognition Software. Although reviewed after completion, some word and grammatical error may remain.    Ravi Antonio MD

## 2018-04-05 NOTE — NURSING NOTE
"Chief Complaint   Patient presents with     RECHECK     Patient states she is doing ok, some days she has pain.       Initial /69  Pulse 80  Resp 14  Ht 1.626 m (5' 4\")  Wt 69.3 kg (152 lb 12.8 oz)  SpO2 96%  BMI 26.23 kg/m2 Estimated body mass index is 26.23 kg/(m^2) as calculated from the following:    Height as of this encounter: 1.626 m (5' 4\").    Weight as of this encounter: 69.3 kg (152 lb 12.8 oz).  BP completed using cuff size: regular         RAPID3 (0-30) Cumulative Score  10.2          RAPID3 Weighted Score (divide #4 by 3 and that is the weighted score)  3.4         "

## 2018-08-02 DIAGNOSIS — E55.9 VITAMIN D DEFICIENCY: ICD-10-CM

## 2018-08-02 DIAGNOSIS — M35.01 SJOGREN'S SYNDROME WITH KERATOCONJUNCTIVITIS SICCA (H): ICD-10-CM

## 2018-08-02 LAB
ALBUMIN SERPL-MCNC: 3.2 G/DL (ref 3.4–5)
ALBUMIN UR-MCNC: NEGATIVE MG/DL
ALP SERPL-CCNC: 64 U/L (ref 40–150)
ALT SERPL W P-5'-P-CCNC: 23 U/L (ref 0–50)
ANION GAP SERPL CALCULATED.3IONS-SCNC: 6 MMOL/L (ref 3–14)
APPEARANCE UR: CLEAR
AST SERPL W P-5'-P-CCNC: 21 U/L (ref 0–45)
BASOPHILS # BLD AUTO: 0 10E9/L (ref 0–0.2)
BASOPHILS NFR BLD AUTO: 0 %
BILIRUB SERPL-MCNC: 0.3 MG/DL (ref 0.2–1.3)
BILIRUB UR QL STRIP: NEGATIVE
BUN SERPL-MCNC: 5 MG/DL (ref 7–30)
CALCIUM SERPL-MCNC: 8.6 MG/DL (ref 8.5–10.1)
CHLORIDE SERPL-SCNC: 109 MMOL/L (ref 94–109)
CK SERPL-CCNC: 28 U/L (ref 30–225)
CO2 SERPL-SCNC: 26 MMOL/L (ref 20–32)
COLOR UR AUTO: YELLOW
CREAT SERPL-MCNC: 0.44 MG/DL (ref 0.52–1.04)
CREAT UR-MCNC: 10 MG/DL
CRP SERPL-MCNC: <2.9 MG/L (ref 0–8)
DEPRECATED CALCIDIOL+CALCIFEROL SERPL-MC: 30 UG/L (ref 20–75)
DIFFERENTIAL METHOD BLD: ABNORMAL
EOSINOPHIL # BLD AUTO: 0.1 10E9/L (ref 0–0.7)
EOSINOPHIL NFR BLD AUTO: 1.9 %
ERYTHROCYTE [DISTWIDTH] IN BLOOD BY AUTOMATED COUNT: 12.7 % (ref 10–15)
ERYTHROCYTE [SEDIMENTATION RATE] IN BLOOD BY WESTERGREN METHOD: 19 MM/H (ref 0–20)
GFR SERPL CREATININE-BSD FRML MDRD: >90 ML/MIN/1.7M2
GLUCOSE SERPL-MCNC: 90 MG/DL (ref 70–99)
GLUCOSE UR STRIP-MCNC: NEGATIVE MG/DL
HCT VFR BLD AUTO: 36.2 % (ref 35–47)
HGB BLD-MCNC: 12.2 G/DL (ref 11.7–15.7)
HGB UR QL STRIP: NEGATIVE
KETONES UR STRIP-MCNC: NEGATIVE MG/DL
LEUKOCYTE ESTERASE UR QL STRIP: NEGATIVE
LYMPHOCYTES # BLD AUTO: 1.4 10E9/L (ref 0.8–5.3)
LYMPHOCYTES NFR BLD AUTO: 41.9 %
MCH RBC QN AUTO: 27.5 PG (ref 26.5–33)
MCHC RBC AUTO-ENTMCNC: 33.7 G/DL (ref 31.5–36.5)
MCV RBC AUTO: 82 FL (ref 78–100)
MONOCYTES # BLD AUTO: 0.4 10E9/L (ref 0–1.3)
MONOCYTES NFR BLD AUTO: 13 %
NEUTROPHILS # BLD AUTO: 1.4 10E9/L (ref 1.6–8.3)
NEUTROPHILS NFR BLD AUTO: 43.2 %
NITRATE UR QL: NEGATIVE
PH UR STRIP: 7 PH (ref 5–7)
PLATELET # BLD AUTO: 177 10E9/L (ref 150–450)
POTASSIUM SERPL-SCNC: 3.7 MMOL/L (ref 3.4–5.3)
PROT SERPL-MCNC: 7.9 G/DL (ref 6.8–8.8)
PROT UR-MCNC: <0.05 G/L
PROT/CREAT 24H UR: NORMAL G/G CR (ref 0–0.2)
RBC # BLD AUTO: 4.43 10E12/L (ref 3.8–5.2)
SODIUM SERPL-SCNC: 141 MMOL/L (ref 133–144)
SOURCE: NORMAL
SP GR UR STRIP: <=1.005 (ref 1–1.03)
UROBILINOGEN UR STRIP-ACNC: 0.2 EU/DL (ref 0.2–1)
WBC # BLD AUTO: 3.2 10E9/L (ref 4–11)

## 2018-08-02 PROCEDURE — 86225 DNA ANTIBODY NATIVE: CPT | Performed by: INTERNAL MEDICINE

## 2018-08-02 PROCEDURE — 82550 ASSAY OF CK (CPK): CPT | Performed by: INTERNAL MEDICINE

## 2018-08-02 PROCEDURE — 84156 ASSAY OF PROTEIN URINE: CPT | Performed by: INTERNAL MEDICINE

## 2018-08-02 PROCEDURE — 82306 VITAMIN D 25 HYDROXY: CPT | Performed by: INTERNAL MEDICINE

## 2018-08-02 PROCEDURE — 86160 COMPLEMENT ANTIGEN: CPT | Performed by: INTERNAL MEDICINE

## 2018-08-02 PROCEDURE — 85652 RBC SED RATE AUTOMATED: CPT | Performed by: INTERNAL MEDICINE

## 2018-08-02 PROCEDURE — 85025 COMPLETE CBC W/AUTO DIFF WBC: CPT | Performed by: INTERNAL MEDICINE

## 2018-08-02 PROCEDURE — 86140 C-REACTIVE PROTEIN: CPT | Performed by: INTERNAL MEDICINE

## 2018-08-02 PROCEDURE — 80053 COMPREHEN METABOLIC PANEL: CPT | Performed by: INTERNAL MEDICINE

## 2018-08-02 PROCEDURE — 36415 COLL VENOUS BLD VENIPUNCTURE: CPT | Performed by: INTERNAL MEDICINE

## 2018-08-02 PROCEDURE — 81003 URINALYSIS AUTO W/O SCOPE: CPT | Performed by: INTERNAL MEDICINE

## 2018-08-03 LAB
C3 SERPL-MCNC: 103 MG/DL (ref 76–169)
C4 SERPL-MCNC: 15 MG/DL (ref 15–50)
DSDNA AB SER-ACNC: 2 IU/ML

## 2018-08-09 ENCOUNTER — OFFICE VISIT (OUTPATIENT)
Dept: RHEUMATOLOGY | Facility: CLINIC | Age: 38
End: 2018-08-09
Payer: COMMERCIAL

## 2018-08-09 VITALS
HEIGHT: 64 IN | DIASTOLIC BLOOD PRESSURE: 69 MMHG | OXYGEN SATURATION: 92 % | SYSTOLIC BLOOD PRESSURE: 111 MMHG | HEART RATE: 79 BPM | BODY MASS INDEX: 21.95 KG/M2 | RESPIRATION RATE: 16 BRPM | WEIGHT: 128.6 LBS

## 2018-08-09 DIAGNOSIS — M35.01 SJOGREN'S SYNDROME WITH KERATOCONJUNCTIVITIS SICCA (H): Primary | ICD-10-CM

## 2018-08-09 DIAGNOSIS — Z79.899 HIGH RISK MEDICATION USE: ICD-10-CM

## 2018-08-09 PROCEDURE — 99214 OFFICE O/P EST MOD 30 MIN: CPT | Performed by: INTERNAL MEDICINE

## 2018-08-09 RX ORDER — HYDROXYCHLOROQUINE SULFATE 200 MG/1
TABLET, FILM COATED ORAL
Qty: 135 TABLET | Refills: 1 | Status: SHIPPED | OUTPATIENT
Start: 2018-08-09 | End: 2020-05-07

## 2018-08-09 NOTE — PATIENT INSTRUCTIONS
Hydroxychloroquine (Plaquenil)    Rheumatology    Dr. Ravi Ferrara [facility]   (Monday)  [address]  [place] [address]       [facility]   (Tuesday)  [address] [place] [address]    [facility]   (Wed., Thurs., and Friday)  [address] [place] [address]    Phone number: [phone]  Thank you for choosing [facility].  Angela Harrison [facility]

## 2018-08-09 NOTE — MR AVS SNAPSHOT
After Visit Summary   8/9/2018    Mireya Dewitt    MRN: [id number]           Patient Information     Date Of Birth          1980        Visit Information        Provider Department      8/9/2018 3:00 PM Ravi Antonio MD [facility]        Today's Diagnoses     Sjogren's syndrome with keratoconjunctivitis sicca (H)    -  1    High risk medication use          Care Instructions    Hydroxychloroquine (Plaquenil)    Rheumatology    Dr. Ravi Ferrara [facility]   (Monday)  [address]  [place] [address]       [facility]   (Tuesday)  [address] [place] [address]    [facility]   (Wed., Thurs., and Friday)  [address] [place] [address]    Phone number: [phone]  Thank you for choosing [facility].  Angela Harrison CMA            Follow-ups after your visit        Additional Services     OPHTHALMOLOGY ADULT REFERRAL       Your provider has referred you to:  FMG: [facility] [phone]   [email or web address]    Reason for referral: Hydroxychloroquine (plaquenil) toxicity monitoring.     Please be aware that coverage of these services is subject to the terms and limitations of your health insurance plan.  Call member services at your health plan with any benefit or coverage questions.      Please bring the following to your appointment:  >>   Any x-rays, CTs or MRIs which have been performed.  Contact the facility where they were done to arrange for  prior to your scheduled appointment.  Any new CT, MRI or other procedures ordered by your specialist must be performed at a [facility] facility or coordinated by your clinic's referral office.    >>   List of current medications   >>   This referral request   >>   Any documents/labs given to you for this referral                  Your next 10 appointments already scheduled     Dec 17, 2018  5:45 PM CST   LAB with [facility]   [facility]  [facility] ([facility])    [address]  [place] [address]   [phone]           Please do not eat 10-12 hours before your appointment if you are coming in fasting for labs on lipids, cholesterol, or glucose (sugar). This does not apply to pregnant women. Water, hot tea and black coffee (with nothing added) are okay. Do not drink other fluids, diet soda or chew gum.            Dec 20, 2018  3:40 PM CST   Return Visit with Ravi Antonio MD   [facility]    [address]  [place] [address]   [phone]              Future tests that were ordered for you today     Open Future Orders        Priority Expected Expires Ordered    CBC with platelets differential Routine 11/27/2018 12/28/2018 8/9/2018    Comprehensive metabolic panel Routine 11/27/2018 12/28/2018 8/9/2018    UA reflex to Microscopic and Culture Routine 11/27/2018 12/28/2018 8/9/2018    Protein  random urine with Creat Ratio Routine 11/27/2018 12/28/2018 8/9/2018    CRP inflammation Routine 11/27/2018 12/28/2018 8/9/2018    Erythrocyte sedimentation rate auto Routine 11/27/2018 12/28/2018 8/9/2018            Who to contact     If you have questions or need follow up information about today's clinic visit or your schedule please contact [facility] directly at [phone].  Normal or non-critical lab and imaging results will be communicated to you by [employer], letter or phone within 4 business days after the clinic has received the results. If you do not hear from us within 7 days, please contact the clinic through [employer] or phone. If you have a critical or abnormal lab result, we will notify you by phone as soon as possible.  Submit refill requests through [employer] or call your pharmacy and they will forward the refill request to us. Please allow 3 business days for your refill to be completed.          Additional Information About Your Visit        [employer]  "Information     [employer] lets you send messages to your doctor, view your test results, renew your prescriptions, schedule appointments and more. To sign up, go to [email or web address] . Click on \"Log in\" on the left side of the screen, which will take you to the Welcome page. Then click on \"Sign up Now\" on the right side of the page.     You will be asked to enter the access code listed below, as well as some personal information. Please follow the directions to create your username and password.     Your access code is: [email or web address]  Expires: 10/31/2018  9:33 AM     Your access code will  in 90 days. If you need help or a new code, please call your [facility] clinic or [phone].        Care EveryWhere ID     This is your Care EveryWhere ID. This could be used by other organizations to access your [facility] medical records  [id number]        Your Vitals Were     Pulse Respirations Height Pulse Oximetry BMI (Body Mass Index)       79 16 1.626 m (5' 4\") 92% 22.07 kg/m2        Blood Pressure from Last 3 Encounters:   18 111/69   18 119/69   18 108/58    Weight from Last 3 Encounters:   18 58.3 kg (128 lb 9.6 oz)   18 69.3 kg (152 lb 12.8 oz)   18 62 kg (136 lb 9.6 oz)              We Performed the Following     OPHTHALMOLOGY ADULT REFERRAL          Today's Medication Changes          These changes are accurate as of 18  3:32 PM.  If you have any questions, ask your nurse or doctor.               Start taking these medicines.        Dose/Directions    hydroxychloroquine 200 MG tablet   Commonly known as:  PLAQUENIL   Used for:  Sjogren's syndrome with keratoconjunctivitis sicca (H)   Started by:  Ravi Antonio MD        Hydroxychloroquine 200mg daily; and an additional 200mg every other day.   Quantity:  135 tablet   Refills:  1            Where to get your medicines      These medications were sent to [place] [id number] [place] [address]" [address] [facility] [address]     Phone:  [phone]     hydroxychloroquine 200 MG tablet                Primary Care Provider Office Phone # Fax #    [facility] [phone] [address] [facility] [address]        Equal Access to Services     KIRK REILLY : Hadii aad ku hadasho Soomaali, waaxda luqadaha, qaybta kaalmada adeegyada, waxay idiin hayaan adeeg kharash laradha . So [facility] [phone].    ATENCIÓN: Si habla español, tiene a arevalo disposición servicios gratuitos de asistencia lingüística. Brianna al [phone].    We comply with applicable federal civil rights laws and [place] laws. We do not discriminate on the basis of race, color, national origin, age, disability, sex, sexual orientation, or gender identity.            Thank you!     Thank you for choosing [facility]  for your care. Our goal is always to provide you with excellent care. Hearing back from our patients is one way we can continue to improve our services. Please take a few minutes to complete the written survey that you may receive in the mail after your visit with us. Thank you!             Your Updated Medication List - Protect others around you: Learn how to safely use, store and throw away your medicines at www.disposemymeds.org.          This list is accurate as of 8/9/18  3:32 PM.  Always use your most recent med list.                   Brand Name Dispense Instructions for use Diagnosis    ASPIRIN 81 81 MG chewable tablet   Generic drug:  aspirin      Take 81 mg by mouth daily        cholecalciferol 1000 UNIT tablet    vitamin D3    100 tablet    Take 1 tablet (1,000 Units) by mouth daily    Vitamin D deficiency       DISEASE-MODIFYING ANTIRHEUMATIC DRUG NOT PRESCRIBED (INTENTIONAL)     1 each    Take 1 Units by mouth once        hydroxychloroquine 200 MG tablet    PLAQUENIL    135 tablet    Hydroxychloroquine 200mg daily; and an additional 200mg every other day.     Sjogren's syndrome with keratoconjunctivitis sicca (H)       prenatal multivitamin plus iron 27-0.8 MG Tabs per tablet      Take 1 tablet by mouth daily

## 2018-08-09 NOTE — PROGRESS NOTES
Rheumatology Clinic Visit      Mireya Dewitt MRN# [id number]   YOB: 1980 Age: 37 year old      Date of visit: 8/09/18   Referring provider: Dr. Bruna Car  OB: [facility], located in the [facility]    Chief Complaint   Patient presents with:  Arthritis: patient is stiff all over, wrists and ankles are worse    Assessment and Plan     1. Sjogren's Syndrome (THOMAS 1:25101 speckled, SSA+, RF+; history of neutropenia, hx of parotid gland swelling, inflammatory arthritis [primarily PIPs]): Negative RNP, Sm, SSB; labs were done at [facility] in April and May 2017.  Currently with leukopenia/neutopenia. Arthritis component is discussed in #2.  Inflammatory arthritis symptoms but no clear synovitis on exam.  I again advised that she consider hydroxychloroquine and she says that she would like a prescribed and will consider starting it.  - Start hydroxychloroquine 200mg daily; and an additional 200mg every other day  - Labs 1 week prior to the next rheumatology clinic visit: CBC, CMP, ESR, CRP, UA, Uprotein:creatinine    # Hydroxychloroquine (Plaquenil) Risks and Benefits:  The risks and benefits of hydroxychloroquine were discussed in detail and the patient verbalized understanding; the patient also verbalized agreement to get the required ophthalmologic toxicity monitoring.  The risks discussed include, but are not limited to, the risk for hypersensitivity, anaphylaxis, anaphylactoid reactions, irreversible retinal damage, rare hematologic reactions, and rare cardiomyopathy.  Patients with G6PD deficiency or hepatic impairment may be at an increased risk for adverse effects.  I encouraged reviewing the package insert and asking any questions about the medication.       Ms. Dewitt verbalized agreement with and understanding of the rational for the diagnosis and treatment plan.  All questions were answered to best of my ability and the patient's satisfaction. Ms. Dewitt was advised to  contact the clinic with any questions that may arise after the clinic visit.      Thank you for involving me in the care of the patient    Return to clinic: 4 months      HPI   Mireya Dewitt is a 37 year old female with a past medical history significant for Sjogren's Syndrome who is seen for f/u Sjogren's Syndrome.    5/8/2017 [facility] rheumatology clinic note by Dr. Lenny Arriaga documents Sjogren's Syndrome and neutropenia.  Hospitalized for parotid swelling associated with dry eyes and mouth, polyarthralgias, and abnml serologies.  Since hospitalization, joint symptoms improved but morning stiffness for 4-6 hours.  On iron for microcytic anemia.  Symptoms started during her first pregnancy during 2003 with diffuse joint symptoms.  Dryness started in 2010. 2010 dx'd with IBS; at which time she had a high polyclonal IgG and elevated IgE. Anti-TTA ab negative.  12/2015 fetal loss early in pregnancy.  Proteinuria and thrombocytopenia during pregnancies.      Previously, Ms. Dewitt reported that over the years she has had parotid gland swelling that was successfully treated conservatively, but then she had an episode of painful parotid gland swelling that resulted in hospitalization.  Dry mouth for many years that she attributed to allergies and asthma.  Had a stye and saw Dr. Car that led to this visit and seeing Dr. Mock in ophthalmology.  Joint pain in her PIPs, worse in the morning and shoulder; better with activity.  Morning stiffness for most of the day.      Today, she reports that she has pain everywhere but is worse in the hands and feet.  Morning stiffness that lasts all day, generally reaching a baseline after about 1 hour, and worsening with inactivity.  Mild dry eye and dry mouth that are tolerable and not treated specifically with frequent sips of water or artificial tears.  She would like to remain off of medications at this time.  No longer pregnant - she reports no complications with the pregnancy.      Denies fevers, chills, nausea, vomiting, constipation, diarrhea. No abdominal pain. No chest pain/pressure, palpitations, or shortness of breath. No LE swelling. No neck pain. No oral or nasal sores.  Periodically with itchy rash on neck and chest, but none now.  No photosensitivity or photophobia. No eye pain or redness. No history of inflammatory eye disease.  No history of DVT or pulmonary embolism; one 1st trimester miscarriage; two healthy daughters now.  No history of serositis.  No history of Raynaud's Phenomenon.  No seizure history.  No known renal disorder.      Tobacco: off and on, socially in the past but none now  EtOH: occasional, but none since she found out she is pregnant  Drugs: none  Occupation: cuts hair    ROS   GEN: No fevers, chills, night sweats, or weight change  SKIN: No itching, rashes, sores  HEENT: No oral or nasal ulcers.  CV: No chest pain, pressure, palpitations, or dyspnea on exertion.  PULM: No SOB, wheeze, cough.  GI: No nausea, vomiting, constipation, diarrhea. No blood in stool. No abdominal pain.  : No blood in urine.  MSK: See HPI.  NEURO: No numbness or tingling  EXT: No LE swelling  PSYCH: Negative    Active Problem List     Patient Active Problem List   Diagnosis     Sjogren's syndrome (H)     Inflammatory polyarthropathy (H)     Vitamin D deficiency     Past Medical History     Past Medical History:   Diagnosis Date     Inflammatory polyarthropathy (H) 10/25/2017     Parotitis not due to mumps 4/9/2017     Severe pre-eclampsia 8/26/2016     Past Surgical History   No past surgical history on file.  Allergy   No Known Allergies  Current Medication List     Current Outpatient Prescriptions   Medication Sig     aspirin (ASPIRIN 81) 81 MG chewable tablet Take 81 mg by mouth daily     cholecalciferol (VITAMIN D3) 1000 UNIT tablet Take 1 tablet (1,000 Units) by mouth daily (Patient not taking: Reported on 4/5/2018)     DISEASE-MODIFYING ANTIRHEUMATIC DRUG NOT PRESCRIBED,  "INTENTIONAL, Take 1 Units by mouth once     Prenatal Vit-Fe Fumarate-FA (PRENATAL MULTIVITAMIN PLUS IRON) 27-0.8 MG TABS per tablet Take 1 tablet by mouth daily     No current facility-administered medications for this visit.          Social History   See HPI    Family History     Family History   Problem Relation Age of Onset     Diabetes No family hx of      Maternal aunt and her daughter: celiac  Maternal grandmother's sister's grandchild: celiac    Physical Exam     Temp Readings from Last 3 Encounters:   10/25/17 97.6  F (36.4  C)   07/13/17 97.5  F (36.4  C) (Oral)     BP Readings from Last 5 Encounters:   04/05/18 119/69   01/03/18 108/58   10/25/17 111/57   07/13/17 116/68     Pulse Readings from Last 1 Encounters:   04/05/18 80     Resp Readings from Last 1 Encounters:   04/05/18 14     Estimated body mass index is 26.23 kg/(m^2) as calculated from the following:    Height as of 4/5/18: 1.626 m (5' 4\").    Weight as of 4/5/18: 69.3 kg (152 lb 12.8 oz).    GEN: NAD  HEENT: Dry mucous membranes. No oral lesions. Anicteric, noninjected sclera. Eyes appeared dry by gross examination   CV: S1, S2. RRR. No m/r/g.  PULM: CTA bilaterally. No w/c.  ABD: +BS  MSK: MCPs, PIPs, wrists, elbows, shoulders, knees, ankles, and MTPs without swelling or tenderness to palpation. Hips nontender to direct palpation.   NEURO: UE and LE strengths 5/5 and equal bilaterally.   SKIN: No rash  EXT: No LE edema  PSYCH: Alert. Appropriate.    Labs / Imaging (select studies)   RF/CCP  Recent Labs   Lab Test  10/25/17   1012   CCPIGG  1     dsDNA  Recent Labs   Lab Test  08/02/18   0943  03/28/18   1018  01/03/18   1237  10/25/17   1012   DNA  2  1  1  1     C3/C4  Recent Labs   Lab Test  08/02/18   0943  03/28/18   1018  01/03/18   1237  10/25/17   1012   L0KZIMF  103  118  107  86   J1XRNOP  15  18  16  16     Antiphospholipid Antibodies  Recent Labs   Lab Test  10/25/17   1012   B2GPG  0.8   B2GPM  1.1   CARDG  <1.6   CARDM  <0.2 "   LUPINT  Negative     CBC  Recent Labs   Lab Test  08/02/18   0943  03/28/18   1018  01/03/18   1237   WBC  3.2*  7.1  7.0   RBC  4.43  4.18  3.68*   HGB  12.2  11.7  10.8*   HCT  36.2  35.7  31.9*   MCV  82  85  87   RDW  12.7  15.4*  13.8   PLT  177  189  226   MCH  27.5  28.0  29.3   MCHC  33.7  32.8  33.9   NEUTROPHIL  43.2  71.0  61.0   LYMPH  41.9  20.7  27.9   MONOCYTE  13.0  7.5  10.5   EOSINOPHIL  1.9  0.7  0.6   BASOPHIL  0.0  0.1  0.0   ANEU  1.4*  5.0  4.3   ALYM  1.4  1.5  2.0   CAROLINA  0.4  0.5  0.7   AEOS  0.1  0.1  0.0   ABAS  0.0  0.0  0.0     CMP  Recent Labs   Lab Test  08/02/18   0943  03/28/18   1018  01/03/18   1237  10/25/17   1012   NA  141  138  137  136   POTASSIUM  3.7  3.8  3.9  3.9   CHLORIDE  109  107  106  107   CO2  26  22  23  23   ANIONGAP  6  9  8  6   GLC  90  92  83  78   BUN  5*  4*  8  7   CR  0.44*  0.49*  0.46*  0.37*   GFRESTIMATED  >90  >90  >90  >90   GFRESTBLACK  >90  >90  >90  >90   YONI  8.6  8.3*  8.3*  8.4*   BILITOTAL  0.3  0.3  0.2  0.4   ALBUMIN  3.2*  2.6*  2.6*  3.1*   PROTTOTAL  7.9  7.2  7.2  7.6   ALKPHOS  64  149  68  60   AST  21  20  19  17   ALT  23  15  16  23     Calcium/VitaminD  Recent Labs   Lab Test  08/02/18   0943  03/28/18   1018  01/03/18   1237   YONI  8.6  8.3*  8.3*   VITDT  30   --   14*     ESR/CRP  Recent Labs   Lab Test  08/02/18   0943  03/28/18   1018  01/03/18   1237   SED  19  35*  41*   CRP  <2.9  <2.9  <2.9     CK/Aldolase  Recent Labs   Lab Test  08/02/18   0943  03/28/18   1018  01/03/18   1237   CKT  28*  52  33     UA  Recent Labs   Lab Test  08/02/18   0950  03/28/18   1026  01/03/18   1219  10/25/17   1003   COLOR  Yellow  Yellow  Yellow  Yellow   APPEARANCE  Clear  Clear  Clear  Slightly Cloudy   URINEGLC  Negative  Negative  Negative  Negative   URINEBILI  Negative  Negative  Negative  Negative   SG  <=1.005  <=1.005  1.010  <=1.005   URINEPH  7.0  6.0  6.5  6.5   PROTEIN  Negative  Negative  Negative  Negative   UROBILINOGEN   0.2  0.2  0.2  0.2   NITRITE  Negative  Negative  Negative  Negative   UBLD  Negative  Negative  Negative  Negative   LEUKEST  Negative  Trace*  Large*  Small*   WBCU   --   0 - 5  O - 2  2-5*   RBCU   --   O - 2  O - 2  O - 2   SQUAMOUSEPI   --   Few  Few  Few   BACTERIA   --    --   Few*   --      Urine Microscopic  Recent Labs   Lab Test  03/28/18   1026  01/03/18   1219  10/25/17   1003   WBCU  0 - 5  O - 2  2-5*   RBCU  O - 2  O - 2  O - 2   SQUAMOUSEPI  Few  Few  Few   BACTERIA   --   Few*   --      Urine Protein  Recent Labs   Lab Test  08/02/18   0950  03/28/18   1026  01/03/18   1219  10/25/17   1003   UTP  <0.05  0.05  0.07  <0.05   UTPG  Unable to calculate due to low value  0.23*  0.40*  Unable to calculate due to low value   UCRR  10  22   --   20     Immunization History     Immunization History   Administered Date(s) Administered     Tetanus 07/28/2016          Chart documentation done in part with Dragon Voice recognition Software. Although reviewed after completion, some word and grammatical error may remain.    Ravi Antonio MD

## 2018-09-28 ENCOUNTER — TELEPHONE (OUTPATIENT)
Dept: RHEUMATOLOGY | Facility: CLINIC | Age: 38
End: 2018-09-28

## 2018-09-28 DIAGNOSIS — R22.0 FACIAL SWELLING: Primary | ICD-10-CM

## 2018-09-28 DIAGNOSIS — M35.01 SJOGREN'S SYNDROME WITH KERATOCONJUNCTIVITIS SICCA (H): ICD-10-CM

## 2018-09-28 NOTE — TELEPHONE ENCOUNTER
Pt reports pain and swelling underneath her ear/jaw on the left side that started yesterday  Pt is taking Ibuprofen and using a heat pack  Pt has a history of parotid swelling x 2 years ago and feels like it is a similar incidence  She feels like it is related to her Sjogren's syndrome and is wondering what to do  Encouraged her to stay adequately hydrated, suck on lemon candies, and apply a warm pack  Discussed that she may need to visit her PCP or an ENT and that Dr. Antonio is not in clinic today  Will still route to Dr. Antonio for review/recommendations per patient request  Recommended she go to UC over the weekend if symptoms become intolerable  Pt verbalized understanding     Padilla Burciaga RN....9/28/2018 8:40 AM

## 2018-09-28 NOTE — TELEPHONE ENCOUNTER
Reason for call:  Patient reporting a symptom    Symptom or request: Swollen gland on left side of face, huge and painful.    Duration (how long have symptoms been present): Just today    Have you been treated for this before? Yes    Additional comments: Would like to know what she should do.    Phone Number patient can be reached at:  Home number on file [phone] (home)    Best Time:  Asap    Can we leave a detailed message on this number:  YES    Call taken on 9/28/2018 at 7:51 AM by Lea Causey

## 2018-10-01 NOTE — TELEPHONE ENCOUNTER
Rheumatology team: Please call to notify Ms. Dewitt that she needs to be seen by ENT for evaluation - very well may be Sjogren's related but need to eval for other causes.  If fevers, chills, or intolerable pain then she needs to go to urgent care or the emergency department.     Please provide her with the phone number in the ENT referral so that she may call to schedule.    Ravi Antonio MD  10/1/2018 10:51 AM

## 2018-10-01 NOTE — TELEPHONE ENCOUNTER
Left  notifying pt of Dr. Antonio's message below.  Provided her with phone number to schedule an apt with ENT.    Padilla Burciaga RN....10/1/2018 11:14 AM

## 2020-05-04 ENCOUNTER — TELEPHONE (OUTPATIENT)
Dept: RHEUMATOLOGY | Facility: CLINIC | Age: 40
End: 2020-05-04

## 2020-05-04 NOTE — TELEPHONE ENCOUNTER
Pt is wanting to follow up with [facility], she is wanting to know if lab work is needing to be done as she feels an appt without this would be pointless. Will forward to MD for review. I let pt know she will get a call back when an answer is ready. She inquired about a note for her spouse which I told her is not possible as he is not a pt our dr Antonio.    Zelda Nelson, [facility] Specialty Triage 5/4/2020 2:59 PM

## 2020-05-04 NOTE — TELEPHONE ENCOUNTER
Reason for call:  Other   Patient called regarding (reason for call): appointment  Additional comments: Questions regarding scheduling follow up visit for Sjogren, please call.    Phone number to reach patient:  Home number on file [phone] (home)    Best Time:  any    Can we leave a detailed message on this number?  YES    Travel screening: Not Applicable

## 2020-05-05 NOTE — TELEPHONE ENCOUNTER
Called and informed patient that f/u apt is recommended. Patient verbalized understanding and a video visit was scheduled for 5/7/20 with Dr. Antonio.    Padilla Burciaga RN....5/5/2020 9:12 AM

## 2020-05-06 NOTE — PROGRESS NOTES
"Mireya Dewitt is a 39 year old female who is being evaluated via a billable video visit.      The patient has been notified of following:     \"This video visit will be conducted via a call between you and your physician/provider. We have found that certain health care needs can be provided without the need for an in-person physical exam.  This service lets us provide the care you need with a video conversation.  If a prescription is necessary we can send it directly to your pharmacy.  If lab work is needed we can place an order for that and you can then stop by our lab to have the test done at a later time.    Video visits are billed at different rates depending on your insurance coverage.  Please reach out to your insurance provider with any questions.    If during the course of the call the physician/provider feels a video visit is not appropriate, you will not be charged for this service.\"    Patient has given verbal consent for Video visit? Yes    How would you like to obtain your AVS? Mail a copy    Patient would like the video invitation sent by: Text to cell phone: [phone]    Will anyone else be joining your video visit? No    Rheumatology Video Visit      Mireya Dewitt MRN# [id number]   YOB: 1980 Age: 39 year old      Date of visit: 5/07/20   Referring provider: Dr. Bruna Car  OB: [facility], located in the [facility]    Chief Complaint   Patient presents with:  sjogren's: feeling fine    Assessment and Plan     1. Sjogren's Syndrome (THOMAS 1:[id number] speckled, SSA+, RF+; history of neutropenia, hx of parotid gland swelling, inflammatory arthritis [primarily PIPs]): Negative RNP, Sm, SSB; [place] [facility] [place]. When last seen in 2018 she had  Leukopenia/neutopenia and arthritis; hydroxychloroquine was Rx'd but never started and then she failed to follow-up in clinic. No recent labs to review and inflammatory symptoms in her PIPs.  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  Discussed starting hydroxychloroquine and will do so at 200mg daily based on her reported weight of 130lb   - start hydroxychloroquine 200mg daily  - labs within a couple weeks: CBC, CMP, ESR, CRP, CK, C3, C4, dsDNA, UA, Uprotein:creatinine    # Hydroxychloroquine (Plaquenil) Risks and Benefits:  The risks and benefits of hydroxychloroquine were discussed in detail and the patient verbalized understanding; the patient also verbalized agreement to get the required ophthalmologic toxicity monitoring.  The risks discussed include, but are not limited to, the risk for hypersensitivity, anaphylaxis, anaphylactoid reactions, irreversible retinal damage, rare hematologic reactions, and rare cardiomyopathy.  Patients with G6PD deficiency or hepatic impairment may be at an increased risk for adverse effects.  I encouraged reviewing the package insert and asking any questions about the medication.       # Relevant labs and imaging were reviewed with the patient    # Note that this is a virtual visit to reduce the risk of COVID-19 exposure during this current pandemic.      Ms. Dewitt verbalized agreement with and understanding of the rational for the diagnosis and treatment plan.  All questions were answered to best of my ability and the patient's satisfaction. Ms. Dewitt was advised to contact the clinic with any questions that may arise after the clinic visit.      Thank you for involving me in the care of the patient    Return to clinic: 4 months      HPI   Mireya Dewitt is a 39 year old female with a past medical history significant for Sjogren's Syndrome who is seen for f/u Sjogren's Syndrome.    [place] [facility] [place]. Lenny Arriaga documents Sjogren's Syndrome and neutropenia.  Hospitalized for parotid swelling associated with dry eyes and mouth, polyarthralgias, and abnml serologies.   "Since hospitalization, joint symptoms improved but morning stiffness for 4-6 hours.  On iron for microcytic anemia.  Symptoms started during her first pregnancy during 2003 with diffuse joint symptoms.  Dryness started in 2010. 2010 dx'd with IBS; at which time she had a high polyclonal IgG and elevated IgE. Anti-TTA ab negative.  12/2015 fetal loss early in pregnancy.  Proteinuria and thrombocytopenia during pregnancies.      Previously, Ms. Dewitt reported that over the years she has had parotid gland swelling that was successfully treated conservatively, but then she had an episode of painful parotid gland swelling that resulted in hospitalization.  Dry mouth for many years that she attributed to allergies and asthma.  Had a stye and saw Dr. Car that led to this visit and seeing Dr. Mock in ophthalmology.  Joint pain in her PIPs, worse in the morning and shoulder; better with activity.  Morning stiffness for most of the day.      8/9/2018: she reports that she has pain everywhere but is worse in the hands and feet.  Morning stiffness that lasts all day, generally reaching a baseline after about 1 hour, and worsening with inactivity.  Mild dry eye and dry mouth that are tolerable and not treated specifically with frequent sips of water or artificial tears.  She would like to remain off of medications at this time.  No longer pregnant - she reports no complications with the pregnancy.     12/20/2018: no show to scheduled clinic visit    Today, 5/7/2020: joints \"managed every day, different days are different amounts of pain\".  Pain at the PIPs in the AM, improves with time and activity; sore all day. MCPs, DIPs, wrists, shoulders okay.  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 Hips, knees, ankles, and feet are okay.  Neck pain on and off; no radiation of the pain. Dry mouth and dry eyes are well controlled with artificial tears and frequent sips of water. Mild allergy symptoms - " itchy watery eyes and runny nose that happens in the spring; she plans to start an antihistamine such as zyrtec or claritin.     Denies fevers, chills, nausea, vomiting, constipation, diarrhea. No abdominal pain. No chest pain/pressure, palpitations, or shortness of breath. No LE swelling. No neck pain. No oral or nasal sores.  Periodically with itchy rash on neck and chest, but none now.  No photosensitivity or photophobia. No eye pain or redness. No history of inflammatory eye disease.  No history of DVT or pulmonary embolism; one 1st trimester miscarriage; two healthy daughters now.  No history of serositis.  No history of Raynaud's Phenomenon.  No seizure history.  No known renal disorder.      Tobacco: off and on, socially in the past but none now  EtOH: occasional, but none since she found out she is pregnant  Drugs: none  Occupation: cuts hair    ROS   GEN: No fevers, chills, night sweats, or weight change  SKIN: No rashes, sores  HEENT: No oral or nasal ulcers. See HPI  CV: No chest pain, pressure, palpitations, or dyspnea on exertion.  PULM: No SOB, wheeze, cough.  GI: No nausea, vomiting, constipation, diarrhea. No blood in stool. No abdominal pain.  : No blood in urine.  MSK: See HPI.  NEURO: No numbness or tingling  EXT: No LE swelling  PSYCH: Negative    Active Problem List     Patient Active Problem List   Diagnosis     Sjogren's syndrome (H)     Inflammatory polyarthropathy (H)     Vitamin D deficiency     Past Medical History     Past Medical History:   Diagnosis Date     Inflammatory polyarthropathy (H) 10/25/2017     Parotitis not due to mumps 4/9/2017     Severe pre-eclampsia 8/26/2016     Past Surgical History   History reviewed. No pertinent surgical history.  Allergy   No Known Allergies  Current Medication List     Current Outpatient Medications   Medication Sig     aspirin (ASPIRIN 81) 81 MG chewable tablet Take 81 mg by mouth daily     cholecalciferol (VITAMIN D3) 1000 UNIT tablet Take 1  "tablet (1,000 Units) by mouth daily (Patient not taking: Reported on 4/5/2018)     hydroxychloroquine (PLAQUENIL) 200 MG tablet Hydroxychloroquine 200mg daily; and an additional 200mg every other day. (Patient not taking: Reported on 5/6/2020)     Prenatal Vit-Fe Fumarate-FA (PRENATAL MULTIVITAMIN PLUS IRON) 27-0.8 MG TABS per tablet Take 1 tablet by mouth daily     No current facility-administered medications for this visit.          Social History   See HPI    Family History     Family History   Problem Relation Age of Onset     Diabetes No family hx of      Maternal aunt and her daughter: celiac  Maternal grandmother's sister's grandchild: celiac    Physical Exam     Temp Readings from Last 3 Encounters:   10/25/17 97.6  F (36.4  C)   07/13/17 97.5  F (36.4  C) (Oral)     BP Readings from Last 5 Encounters:   08/09/18 111/69   04/05/18 119/69   01/03/18 108/58   10/25/17 111/57   07/13/17 116/68     Pulse Readings from Last 1 Encounters:   08/09/18 79     Resp Readings from Last 1 Encounters:   08/09/18 16     Estimated body mass index is 22.07 kg/m  as calculated from the following:    Height as of 8/9/18: 1.626 m (5' 4\").    Weight as of 8/9/18: 58.3 kg (128 lb 9.6 oz).      GEN: NAD  HEENT: MMM.  Anicteric, noninjected sclera  PULM: No increased work of breathing  MSK:  Hands and wrists without swelling.   PSYCH: Alert. Appropriate.        Labs / Imaging (select studies)   RF/CCP  Recent Labs   Lab Test 10/25/17  1012   CCPIGG 1     CBC  Recent Labs   Lab Test 08/02/18  0943 03/28/18  1018 01/03/18  1237   WBC 3.2* 7.1 7.0   RBC 4.43 4.18 3.68*   HGB 12.2 11.7 10.8*   HCT 36.2 35.7 31.9*   MCV 82 85 87   RDW 12.7 15.4* 13.8    189 226   MCH 27.5 28.0 29.3   MCHC 33.7 32.8 33.9   NEUTROPHIL 43.2 71.0 61.0   LYMPH 41.9 20.7 27.9   MONOCYTE 13.0 7.5 10.5   EOSINOPHIL 1.9 0.7 0.6   BASOPHIL 0.0 0.1 0.0   ANEU 1.4* 5.0 4.3   ALYM 1.4 1.5 2.0   CAROLINA 0.4 0.5 0.7   AEOS 0.1 0.1 0.0   ABAS 0.0 0.0 0.0 "     CMP  Recent Labs   Lab Test 08/02/18  0943 03/28/18  1018 01/03/18  1237    138 137   POTASSIUM 3.7 3.8 3.9   CHLORIDE 109 107 106   CO2 26 22 23   ANIONGAP 6 9 8   GLC 90 92 83   BUN 5* 4* 8   CR 0.44* 0.49* 0.46*   GFRESTIMATED >90 >90 >90   GFRESTBLACK >90 >90 >90   YONI 8.6 8.3* 8.3*   BILITOTAL 0.3 0.3 0.2   ALBUMIN 3.2* 2.6* 2.6*   PROTTOTAL 7.9 7.2 7.2   ALKPHOS 64 149 68   AST 21 20 19   ALT 23 15 16     Calcium/VitaminD  Recent Labs   Lab Test 08/02/18  0943 03/28/18  1018 01/03/18  1237   YONI 8.6 8.3* 8.3*   VITDT 30  --  14*     ESR/CRP  Recent Labs   Lab Test 08/02/18  0943 03/28/18  1018 01/03/18  1237   SED 19 35* 41*   CRP <2.9 <2.9 <2.9     Immunization History     Immunization History   Administered Date(s) Administered     Tetanus 07/28/2016          Chart documentation done in part with Dragon Voice recognition Software. Although reviewed after completion, some word and grammatical error may remain.          Video-Visit Details    Type of service:  Video Visit    Video Start Time: 8:30 AM  Video End Time: 8:45 AM    Originating Location (pt. Location): Home    Distant Location (provider location):  Home    Platform used for Video Visit: Carlitos Antonio MD

## 2020-05-07 ENCOUNTER — VIRTUAL VISIT (OUTPATIENT)
Dept: RHEUMATOLOGY | Facility: CLINIC | Age: 40
End: 2020-05-07
Payer: COMMERCIAL

## 2020-05-07 DIAGNOSIS — M19.90 INFLAMMATORY ARTHRITIS: Primary | ICD-10-CM

## 2020-05-07 DIAGNOSIS — M35.01 SJOGREN'S SYNDROME WITH KERATOCONJUNCTIVITIS SICCA (H): ICD-10-CM

## 2020-05-07 PROCEDURE — 99214 OFFICE O/P EST MOD 30 MIN: CPT | Mod: 95 | Performed by: INTERNAL MEDICINE

## 2020-05-07 RX ORDER — HYDROXYCHLOROQUINE SULFATE 200 MG/1
200 TABLET, FILM COATED ORAL DAILY
Qty: 90 TABLET | Refills: 1 | Status: SHIPPED | OUTPATIENT
Start: 2020-05-07 | End: 2020-09-02

## 2020-05-19 DIAGNOSIS — M19.90 INFLAMMATORY ARTHRITIS: ICD-10-CM

## 2020-05-19 DIAGNOSIS — M35.01 SJOGREN'S SYNDROME WITH KERATOCONJUNCTIVITIS SICCA (H): ICD-10-CM

## 2020-05-19 LAB
ALBUMIN SERPL-MCNC: 3.5 G/DL (ref 3.4–5)
ALBUMIN UR-MCNC: NEGATIVE MG/DL
ALP SERPL-CCNC: 109 U/L (ref 40–150)
ALT SERPL W P-5'-P-CCNC: 21 U/L (ref 0–50)
ANION GAP SERPL CALCULATED.3IONS-SCNC: 5 MMOL/L (ref 3–14)
APPEARANCE UR: CLEAR
AST SERPL W P-5'-P-CCNC: 18 U/L (ref 0–45)
BASOPHILS # BLD AUTO: 0 10E9/L (ref 0–0.2)
BASOPHILS NFR BLD AUTO: 0.3 %
BILIRUB SERPL-MCNC: 0.6 MG/DL (ref 0.2–1.3)
BILIRUB UR QL STRIP: NEGATIVE
BUN SERPL-MCNC: 9 MG/DL (ref 7–30)
CALCIUM SERPL-MCNC: 8.8 MG/DL (ref 8.5–10.1)
CHLORIDE SERPL-SCNC: 109 MMOL/L (ref 94–109)
CK SERPL-CCNC: 32 U/L (ref 30–225)
CO2 SERPL-SCNC: 23 MMOL/L (ref 20–32)
COLOR UR AUTO: YELLOW
CREAT SERPL-MCNC: 0.37 MG/DL (ref 0.52–1.04)
CREAT UR-MCNC: 26 MG/DL
CRP SERPL-MCNC: <2.9 MG/L (ref 0–8)
DIFFERENTIAL METHOD BLD: ABNORMAL
EOSINOPHIL # BLD AUTO: 0 10E9/L (ref 0–0.7)
EOSINOPHIL NFR BLD AUTO: 1.2 %
ERYTHROCYTE [DISTWIDTH] IN BLOOD BY AUTOMATED COUNT: 13.3 % (ref 10–15)
ERYTHROCYTE [SEDIMENTATION RATE] IN BLOOD BY WESTERGREN METHOD: 26 MM/H (ref 0–20)
GFR SERPL CREATININE-BSD FRML MDRD: >90 ML/MIN/{1.73_M2}
GLUCOSE SERPL-MCNC: 90 MG/DL (ref 70–99)
GLUCOSE UR STRIP-MCNC: NEGATIVE MG/DL
HCT VFR BLD AUTO: 36.9 % (ref 35–47)
HGB BLD-MCNC: 12.3 G/DL (ref 11.7–15.7)
HGB UR QL STRIP: NEGATIVE
KETONES UR STRIP-MCNC: NEGATIVE MG/DL
LEUKOCYTE ESTERASE UR QL STRIP: NEGATIVE
LYMPHOCYTES # BLD AUTO: 1.4 10E9/L (ref 0.8–5.3)
LYMPHOCYTES NFR BLD AUTO: 41.6 %
MCH RBC QN AUTO: 26.5 PG (ref 26.5–33)
MCHC RBC AUTO-ENTMCNC: 33.3 G/DL (ref 31.5–36.5)
MCV RBC AUTO: 80 FL (ref 78–100)
MONOCYTES # BLD AUTO: 0.6 10E9/L (ref 0–1.3)
MONOCYTES NFR BLD AUTO: 17.1 %
NEUTROPHILS # BLD AUTO: 1.3 10E9/L (ref 1.6–8.3)
NEUTROPHILS NFR BLD AUTO: 39.8 %
NITRATE UR QL: NEGATIVE
PH UR STRIP: 6.5 PH (ref 5–7)
PLATELET # BLD AUTO: 188 10E9/L (ref 150–450)
POTASSIUM SERPL-SCNC: 4 MMOL/L (ref 3.4–5.3)
PROT SERPL-MCNC: 8 G/DL (ref 6.8–8.8)
PROT UR-MCNC: 0.06 G/L
PROT/CREAT 24H UR: 0.23 G/G CR (ref 0–0.2)
RBC # BLD AUTO: 4.64 10E12/L (ref 3.8–5.2)
SODIUM SERPL-SCNC: 137 MMOL/L (ref 133–144)
SOURCE: NORMAL
SP GR UR STRIP: <=1.005 (ref 1–1.03)
UROBILINOGEN UR STRIP-ACNC: 0.2 EU/DL (ref 0.2–1)
WBC # BLD AUTO: 3.3 10E9/L (ref 4–11)

## 2020-05-19 PROCEDURE — 85025 COMPLETE CBC W/AUTO DIFF WBC: CPT | Performed by: INTERNAL MEDICINE

## 2020-05-19 PROCEDURE — 80053 COMPREHEN METABOLIC PANEL: CPT | Performed by: INTERNAL MEDICINE

## 2020-05-19 PROCEDURE — 81003 URINALYSIS AUTO W/O SCOPE: CPT | Performed by: INTERNAL MEDICINE

## 2020-05-19 PROCEDURE — 86160 COMPLEMENT ANTIGEN: CPT | Performed by: INTERNAL MEDICINE

## 2020-05-19 PROCEDURE — 36415 COLL VENOUS BLD VENIPUNCTURE: CPT | Performed by: INTERNAL MEDICINE

## 2020-05-19 PROCEDURE — 84156 ASSAY OF PROTEIN URINE: CPT | Performed by: INTERNAL MEDICINE

## 2020-05-19 PROCEDURE — 86140 C-REACTIVE PROTEIN: CPT | Performed by: INTERNAL MEDICINE

## 2020-05-19 PROCEDURE — 82550 ASSAY OF CK (CPK): CPT | Performed by: INTERNAL MEDICINE

## 2020-05-19 PROCEDURE — 85652 RBC SED RATE AUTOMATED: CPT | Performed by: INTERNAL MEDICINE

## 2020-05-19 PROCEDURE — 86225 DNA ANTIBODY NATIVE: CPT | Performed by: INTERNAL MEDICINE

## 2020-05-20 LAB
C3 SERPL-MCNC: 119 MG/DL (ref 81–157)
C4 SERPL-MCNC: 19 MG/DL (ref 13–39)
DSDNA AB SER-ACNC: 1 IU/ML

## 2020-05-22 NOTE — RESULT ENCOUNTER NOTE
RN: Please call to notify Ms. Dewitt that labs were stable; the white blood cell count remains reduced.     Ravi Antonio MD  5/21/2020 10:02 PM

## 2020-09-01 NOTE — PROGRESS NOTES
"Mireya Dewitt is a 40 year old female who is being evaluated via a billable telephone visit.      The patient has been notified of following:     \"This telephone visit will be conducted via a call between you and your physician/provider. We have found that certain health care needs can be provided without the need for a physical exam.  This service lets us provide the care you need with a short phone conversation.  If a prescription is necessary we can send it directly to your pharmacy.  If lab work is needed we can place an order for that and you can then stop by our lab to have the test done at a later time.    Telephone visits are billed at different rates depending on your insurance coverage. During this emergency period, for some insurers they may be billed the same as an in-person visit.  Please reach out to your insurance provider with any questions.    If during the course of the call the physician/provider feels a telephone visit is not appropriate, you will not be charged for this service.\"    Patient has given verbal consent for Telephone visit?  Yes    What phone number would you like to be contacted at? [phone]    How would you like to obtain your AVS? Mail a copy    Rheumatology Telephone/Telehealth Visit      Mireya Dewitt MRN# [id number]   YOB: 1980 Age: 40 year old      Date of visit: 9/02/20   Referring provider: Dr. Bruna Car  OB: [facility], located in the [facility]    Chief Complaint   Patient presents with:  Follow Up    Assessment and Plan     1. Sjogren's Syndrome (THOMAS [id number] speckled, SSA+, RF+; history of neutropenia, hx of parotid gland swelling, inflammatory arthritis [primarily PIPs]): Negative RNP, Sm, SSB; [place] [facility]. When last seen in 2018 she had  Leukopenia/neutopenia and arthritis; hydroxychloroquine was Rx'd but never started and then she failed to follow-up in clinic.  Hydroxychloroquine was " restarted in May 2020 that she noticed significant improvement but then stopped taking it regularly and several that improvement has been lost.  She is going to do her best to take hydroxychloroquine on a daily basis.  We reviewed methods to improve medical compliance, such as a pillbox, putting medication next to something she does daily such as her toothbrush, and setting phone alarms.  She has her hydroxychloroquine toxicity monitoring exam scheduled today with Dr. Mock.  - Continue hydroxychloroquine 200mg daily  - Labs in 3 months: CBC, CMP, ESR, CRP, C3, C4, dsDNA, UA, Uprotein:creatinine    2.  Vaccinations: Vaccinations reviewed with Ms. Dewitt.  Risks and benefits of vaccinations were discussed.   I explained that Shingrix is used off label when under 50 years old and that the safety and efficacy of the vaccine has not been tested in people younger than 50 years old.   - Influenza: encouraged yearly vaccination  - [id number]: advised receiving  - [id number]: to receive at least 8 weeks after [id number] is administered  - Shingrix: Advised receiving; patient is going to check on insurance coverage before determining if it is going to be received    # Relevant labs and imaging were reviewed with the patient    # High risk medication toxicity monitoring: discussion and labs reviewed; appropriate labs ordered. See above.  Instructed that if confirmed to have COVID-19 or exposure to someone with confirmed COVID-19 to call this clinic for directions on DMARD management.    # Note that this is a virtual visit to reduce the risk of COVID-19 exposure during this current pandemic.      # Considered to be at high risk of complications from the COVID-19 virus.  It is recommended to limit contact with other people and if possible to work remotely or provide a leave of absence to reduce the risk for COVID-19.      Ms. Dewitt verbalized agreement with and understanding of the rational for the diagnosis and treatment  plan.  All questions were answered to best of my ability and the patient's satisfaction. Ms. Dewitt was advised to contact the clinic with any questions that may arise after the clinic visit.      Thank you for involving me in the care of the patient    Return to clinic: 3 months      HPI   Mireya Dewitt is a 40 year old female with a past medical history significant for Sjogren's Syndrome who is seen for f/u Sjogren's Syndrome.    5/8/2017 [facility] [place]ology clinic note by Dr. Lenny Arriaga documents Sjogren's Syndrome and neutropenia.  Hospitalized for parotid swelling associated with dry eyes and mouth, polyarthralgias, and abnml serologies.  Since hospitalization, joint symptoms improved but morning stiffness for 4-6 hours.  On iron for microcytic anemia.  Symptoms started during her first pregnancy during 2003 with diffuse joint symptoms.  Dryness started in 2010. 2010 dx'd with IBS; at which time she had a high polyclonal IgG and elevated IgE. Anti-TTA ab negative.  12/2015 fetal loss early in pregnancy.  Proteinuria and thrombocytopenia during pregnancies.      Previously, Ms. Dewitt reported that over the years she has had parotid gland swelling that was successfully treated conservatively, but then she had an episode of painful parotid gland swelling that resulted in hospitalization.  Dry mouth for many years that she attributed to allergies and asthma.  Had a stye and saw Dr. Car that led to this visit and seeing Dr. Mock in ophthalmology.  Joint pain in her PIPs, worse in the morning and shoulder; better with activity.  Morning stiffness for most of the day.      8/9/2018: she reports that she has pain everywhere but is worse in the hands and feet.  Morning stiffness that lasts all day, generally reaching a baseline after about 1 hour, and worsening with inactivity.  Mild dry eye and dry mouth that are tolerable and not treated specifically with frequent sips of water or artificial tears.  She would  "like to remain off of medications at this time.  No longer pregnant - she reports no complications with the pregnancy.     12/20/2018: no show to scheduled clinic visit    5/7/2020: joints \"managed every day, different days are different amounts of pain\".  Pain at the PIPs in the AM, improves with time and activity; sore all day. MCPs, DIPs, wrists, shoulders okay.  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 Hips, knees, ankles, and feet are okay.  Neck pain on and off; no radiation of the pain. Dry mouth and dry eyes are well controlled with artificial tears and frequent sips of water. Mild allergy symptoms - itchy watery eyes and runny nose that happens in the spring; she plans to start an antihistamine such as zyrtec or claritin.     Today, 9/2/2020: felt much better when she was on hydroxychloroquine, within the first 2 weeks of taking it.  She then has been poor about taking it on a daily basis and misses many doses she says.  She says that she hopes to be better about taking on a regular basis because it has been so beneficial.  Currently without rash.  No joint swelling.  Morning stiffness for less than 20 minutes.  Dry mouth and dry eyes are controlled with artificial tears and frequent sips of water.  Denies any dental caries.    Denies fevers, chills, nausea, vomiting, constipation, diarrhea. No abdominal pain. No chest pain/pressure, palpitations, or shortness of breath. No LE swelling. No neck pain. No oral or nasal sores.  Periodically with itchy rash on neck and chest, but none now.  No photosensitivity or photophobia. No eye pain or redness. No history of inflammatory eye disease.  No history of DVT or pulmonary embolism; one 1st trimester miscarriage; two healthy daughters now.  No history of serositis.  No history of Raynaud's Phenomenon.  No seizure history.  No known renal disorder.      Tobacco: off and on, socially in the past but none now  EtOH: occasional, but " none since she found out she is pregnant  Drugs: none  Occupation: cuts hair    ROS   GEN: No fevers, chills, night sweats, or weight change  SKIN: No rashes, sores  HEENT: No oral or nasal ulcers. See HPI  CV: No chest pain, pressure, palpitations, or dyspnea on exertion.  PULM: No SOB, wheeze, cough.  GI: No nausea, vomiting, constipation, diarrhea. No blood in stool. No abdominal pain.  : No blood in urine.  MSK: See HPI.  NEURO: No numbness or tingling  EXT: No LE swelling  PSYCH: Negative    Active Problem List     Patient Active Problem List   Diagnosis     Sjogren's syndrome (H)     Inflammatory polyarthropathy (H)     Vitamin D deficiency     Past Medical History     Past Medical History:   Diagnosis Date     Inflammatory polyarthropathy (H) 10/25/2017     Parotitis not due to mumps 4/9/2017     Severe pre-eclampsia 8/26/2016     Past Surgical History   History reviewed. No pertinent surgical history.  Allergy     Allergies   Allergen Reactions     Latex      Other reaction(s): Irritation At Patch Site  Irritation with use     Current Medication List     Current Outpatient Medications   Medication Sig     aspirin (ASPIRIN 81) 81 MG chewable tablet Take 81 mg by mouth daily     hydroxychloroquine (PLAQUENIL) 200 MG tablet Take 1 tablet (200 mg) by mouth daily     Prenatal Vit-Fe Fumarate-FA (PRENATAL MULTIVITAMIN PLUS IRON) 27-0.8 MG TABS per tablet Take 1 tablet by mouth daily     cholecalciferol (VITAMIN D3) 1000 UNIT tablet Take 1 tablet (1,000 Units) by mouth daily (Patient not taking: Reported on 4/5/2018)     No current facility-administered medications for this visit.          Social History   See HPI    Family History     Family History   Problem Relation Age of Onset     Diabetes No family hx of      Maternal aunt and her daughter: celiac  Maternal grandmother's sister's grandchild: celiac    Physical Exam     Temp Readings from Last 3 Encounters:   10/25/17 97.6  F (36.4  C)   07/13/17 97.5  F  "(36.4  C) (Oral)     BP Readings from Last 5 Encounters:   08/09/18 111/69   04/05/18 119/69   01/03/18 108/58   10/25/17 111/57   07/13/17 116/68     Pulse Readings from Last 1 Encounters:   08/09/18 79     Resp Readings from Last 1 Encounters:   08/09/18 16     Estimated body mass index is 22.07 kg/m  as calculated from the following:    Height as of 8/9/18: 1.626 m (5' 4\").    Weight as of 8/9/18: 58.3 kg (128 lb 9.6 oz).    Telephone Visit     Labs / Imaging (select studies)   RF/CCP  Recent Labs   Lab Test 10/25/17  1012   CCPIGG 1     dsDNA  Recent Labs   Lab Test 05/19/20  0943 08/02/18  0943 03/28/18  1018 01/03/18  1237 10/25/17  1012   DNA 1 2 1 1 1     C3/C4  Recent Labs   Lab Test 05/19/20  0943 08/02/18  0943 03/28/18  1018 01/03/18  1237 10/25/17  1012   K3SOLQM 119 103 118 107 86   M3LMYUM 19 15 18 16 16     Antiphospholipid Antibodies  Recent Labs   Lab Test 10/25/17  1012   B2GPG 0.8   B2GPM 1.1   CARDG <1.6   CARDM <0.2   LUPINT Negative     CBC  Recent Labs   Lab Test 05/19/20  0943 08/02/18  0943 03/28/18  1018   WBC 3.3* 3.2* 7.1   RBC 4.64 4.43 4.18   HGB 12.3 12.2 11.7   HCT 36.9 36.2 35.7   MCV 80 82 85   RDW 13.3 12.7 15.4*    177 189   MCH 26.5 27.5 28.0   MCHC 33.3 33.7 32.8   NEUTROPHIL 39.8 43.2 71.0   LYMPH 41.6 41.9 20.7   MONOCYTE 17.1 13.0 7.5   EOSINOPHIL 1.2 1.9 0.7   BASOPHIL 0.3 0.0 0.1   ANEU 1.3* 1.4* 5.0   ALYM 1.4 1.4 1.5   CAROLINA 0.6 0.4 0.5   AEOS 0.0 0.1 0.1   ABAS 0.0 0.0 0.0     CMP  Recent Labs   Lab Test 05/19/20  0943 08/02/18  0943 03/28/18  1018 01/03/18  1237 10/25/17  1012    141 138 137 136   POTASSIUM 4.0 3.7 3.8 3.9 3.9   CHLORIDE 109 109 107 106 107   CO2 23 26 22 23 23   ANIONGAP 5 6 9 8 6   GLC 90 90 92 83 78   BUN 9 5* 4* 8 7   CR 0.37* 0.44* 0.49* 0.46* 0.37*   GFRESTIMATED >90 >90 >90 >90 >90   GFRESTBLACK >90 >90 >90 >90 >90   YONI 8.8 8.6 8.3* 8.3* 8.4*   BILITOTAL 0.6 0.3 0.3 0.2 0.4   ALBUMIN 3.5 3.2* 2.6* 2.6* 3.1*   PROTTOTAL 8.0 7.9 7.2 " 7.2 7.6   ALKPHOS 109 64 149 68 60   AST 18 21 20 19 17   ALT 21 23 15 16 23     Calcium/VitaminD  Recent Labs   Lab Test 05/19/20  0943 08/02/18  0943 03/28/18  1018 01/03/18  1237   YONI 8.8 8.6 8.3* 8.3*   VITDT  --  30  --  14*     ESR/CRP  Recent Labs   Lab Test 05/19/20  0943 08/02/18  0943 03/28/18  1018   SED 26* 19 35*   CRP <2.9 <2.9 <2.9     CK/Aldolase  Recent Labs   Lab Test 05/19/20  0943 08/02/18  0943 03/28/18  1018   CKT 32 28* 52     UA  Recent Labs   Lab Test 05/19/20  0955 08/02/18  0950 03/28/18  1026 01/03/18  1219 10/25/17  1003   COLOR Yellow Yellow Yellow Yellow Yellow   APPEARANCE Clear Clear Clear Clear Slightly Cloudy   URINEGLC Negative Negative Negative Negative Negative   URINEBILI Negative Negative Negative Negative Negative   SG <=1.005 <=1.005 <=1.005 1.010 <=1.005   URINEPH 6.5 7.0 6.0 6.5 6.5   PROTEIN Negative Negative Negative Negative Negative   UROBILINOGEN 0.2 0.2 0.2 0.2 0.2   NITRITE Negative Negative Negative Negative Negative   UBLD Negative Negative Negative Negative Negative   LEUKEST Negative Negative Trace* Large* Small*   WBCU  --   --  0 - 5 O - 2 2-5*   RBCU  --   --  O - 2 O - 2 O - 2   SQUAMOUSEPI  --   --  Few Few Few   BACTERIA  --   --   --  Few*  --      Urine Microscopic  Recent Labs   Lab Test 03/28/18  1026 01/03/18  1219 10/25/17  1003   WBCU 0 - 5 O - 2 2-5*   RBCU O - 2 O - 2 O - 2   SQUAMOUSEPI Few Few Few   BACTERIA  --  Few*  --      Urine Protein  Recent Labs   Lab Test 05/19/20  0955 08/02/18  0950 03/28/18  1026   UTP 0.06 <0.05 0.05   UTPG 0.23* Unable to calculate due to low value 0.23*   UCRR 26 10 22     Immunization History     Immunization History   Administered Date(s) Administered     Tetanus 07/28/2016          Chart documentation done in part with Dragon Voice recognition Software. Although reviewed after completion, some word and grammatical error may remain.      Phone call start time: 8:12 AM  Phone call end time: 8:25 AM    This visit  is equivalent to a 87801 visit    Location of patient: home, in [place]  Location of provider: home    Follow up:  follow up appointment scheduled to be in Dec    Ravi Antonio MD

## 2020-09-02 ENCOUNTER — VIRTUAL VISIT (OUTPATIENT)
Dept: RHEUMATOLOGY | Facility: CLINIC | Age: 40
End: 2020-09-02
Payer: COMMERCIAL

## 2020-09-02 ENCOUNTER — OFFICE VISIT (OUTPATIENT)
Dept: OPHTHALMOLOGY | Facility: CLINIC | Age: 40
End: 2020-09-02
Payer: COMMERCIAL

## 2020-09-02 DIAGNOSIS — M35.01 SJOGREN'S SYNDROME WITH KERATOCONJUNCTIVITIS SICCA (H): ICD-10-CM

## 2020-09-02 DIAGNOSIS — Z79.899 HIGH RISK MEDICATION USE: Primary | ICD-10-CM

## 2020-09-02 DIAGNOSIS — H52.4 PRESBYOPIA: ICD-10-CM

## 2020-09-02 DIAGNOSIS — Z79.899 HIGH RISK MEDICATION USE: ICD-10-CM

## 2020-09-02 DIAGNOSIS — M06.4 INFLAMMATORY POLYARTHROPATHY (H): ICD-10-CM

## 2020-09-02 DIAGNOSIS — H52.03 HYPEROPIA OF BOTH EYES: ICD-10-CM

## 2020-09-02 DIAGNOSIS — Z01.00 EXAMINATION OF EYES AND VISION: Primary | ICD-10-CM

## 2020-09-02 DIAGNOSIS — H52.223 REGULAR ASTIGMATISM OF BOTH EYES: ICD-10-CM

## 2020-09-02 DIAGNOSIS — M19.90 INFLAMMATORY ARTHRITIS: ICD-10-CM

## 2020-09-02 DIAGNOSIS — H04.123 DRY EYE SYNDROME, BILATERAL: ICD-10-CM

## 2020-09-02 PROCEDURE — 99213 OFFICE O/P EST LOW 20 MIN: CPT | Mod: 95 | Performed by: INTERNAL MEDICINE

## 2020-09-02 PROCEDURE — 92004 COMPRE OPH EXAM NEW PT 1/>: CPT | Performed by: STUDENT IN AN ORGANIZED HEALTH CARE EDUCATION/TRAINING PROGRAM

## 2020-09-02 PROCEDURE — 92015 DETERMINE REFRACTIVE STATE: CPT | Performed by: STUDENT IN AN ORGANIZED HEALTH CARE EDUCATION/TRAINING PROGRAM

## 2020-09-02 RX ORDER — HYDROXYCHLOROQUINE SULFATE 200 MG/1
200 TABLET, FILM COATED ORAL DAILY
Qty: 90 TABLET | Refills: 2 | Status: SHIPPED | OUTPATIENT
Start: 2020-09-02

## 2020-09-02 ASSESSMENT — EXTERNAL EXAM - RIGHT EYE: OD_EXAM: NORMAL

## 2020-09-02 ASSESSMENT — CONF VISUAL FIELD
OS_NORMAL: 1
OD_NORMAL: 1

## 2020-09-02 ASSESSMENT — VISUAL ACUITY
OS_SC: J3
OD_SC: J4
METHOD: SNELLEN - LINEAR
CORRECTION_TYPE: GLASSES
OD_SC: 20/50
OS_SC: 20/40-2

## 2020-09-02 ASSESSMENT — REFRACTION_MANIFEST
OS_CYLINDER: +1.25
OD_AXIS: 076
OS_AXIS: 093
OD_SPHERE: +1.25
OD_CYLINDER: +1.25
OS_SPHERE: +0.50

## 2020-09-02 ASSESSMENT — SLIT LAMP EXAM - LIDS
COMMENTS: NORMAL
COMMENTS: NORMAL

## 2020-09-02 ASSESSMENT — TONOMETRY
IOP_METHOD: APPLANATION
OS_IOP_MMHG: 18
OD_IOP_MMHG: 18

## 2020-09-02 ASSESSMENT — EXTERNAL EXAM - LEFT EYE: OS_EXAM: NORMAL

## 2020-09-02 ASSESSMENT — CUP TO DISC RATIO
OS_RATIO: 0.2
OD_RATIO: 0.35

## 2020-09-02 NOTE — PATIENT INSTRUCTIONS
"Use artificial tears up to four times a day (like Refresh Optive, Systane Balance, TheraTears, or generic artificial tears are ok. Avoid \"get the red out\" drops).    Could use a gel tear at bedtime and/or up to four times a day (like Refresh Liquigel or GenTeal Gel Tears)    Glasses prescription given - try lubricating eyes well first before filling glasses prescription if needed    Return for baseline Plaquenil eye tests in 2-3 months    Darlin Mock MD  [phone]    "

## 2020-09-02 NOTE — PROGRESS NOTES
" Current Eye Medications:  no     Subjective:  Comprehensive eye exam.    Pt reports her vision can be blurry, she thinks due to her dry eyes, her eyes are also light sensitive. She started Plaquenil in May 2020 (Dr. Antonio).      Objective:  See Ophthalmology Exam.      Assessment:  Mireya Dewitt is a 40 year old female who presents with:   Encounter Diagnoses   Name Primary?     Examination of eyes and vision      Hyperopia of both eyes      Regular astigmatism of both eyes      Presbyopia        Dry eye syndrome, bilateral Discussed scheduled lubrication.       High risk medication use Started Plaquenil May 2020.     Inflammatory polyarthropathy (H)      Sjogren's syndrome with keratoconjunctivitis sicca (H)        Plan:  Use artificial tears up to four times a day (like Refresh Optive, Systane Balance, TheraTears, or generic artificial tears are ok. Avoid \"get the red out\" drops).    Could use a gel tear at bedtime and/or up to four times a day (like Refresh Liquigel or GenTeal Gel Tears)    Glasses prescription given - try lubricating eyes well first before filling glasses prescription if needed    Return for baseline Plaquenil eye tests in 2-3 months    Darlin Mock MD  [phone]      "

## 2020-09-02 NOTE — LETTER
"    9/2/2020         RE: Mireya Dewitt  [address] [place] [address]        Dear Colleague,    Thank you for referring your patient, Mireya Dewitt, to the [facility]. Please see a copy of my visit note below.     Current Eye Medications:  no     Subjective:  Comprehensive eye exam.    Pt reports her vision can be blurry, she thinks due to her dry eyes, her eyes are also light sensitive. She started Plaquenil in May 2020 (Dr. Antonio).      Objective:  See Ophthalmology Exam.      Assessment:  Mireya Dewitt is a 40 year old female who presents with:   Encounter Diagnoses   Name Primary?     Examination of eyes and vision      Hyperopia of both eyes      Regular astigmatism of both eyes      Presbyopia        Dry eye syndrome, bilateral Discussed scheduled lubrication.       High risk medication use Started Plaquenil May 2020.     Inflammatory polyarthropathy (H)      Sjogren's syndrome with keratoconjunctivitis sicca (H)        Plan:  Use artificial tears up to four times a day (like Refresh Optive, Systane Balance, TheraTears, or generic artificial tears are ok. Avoid \"get the red out\" drops).    Could use a gel tear at bedtime and/or up to four times a day (like Refresh Liquigel or GenTeal Gel Tears)    Glasses prescription given - try lubricating eyes well first before filling glasses prescription if needed    Return for baseline Plaquenil eye tests in 2-3 months    Darlin Mock MD  [phone]        Again, thank you for allowing me to participate in the care of your patient.        Sincerely,        Darlin Mock MD    "

## 2020-11-25 ENCOUNTER — TELEPHONE (OUTPATIENT)
Dept: OPHTHALMOLOGY | Facility: CLINIC | Age: 40
End: 2020-11-25

## 2020-11-25 NOTE — TELEPHONE ENCOUNTER
Spoke with patient - starting last week she noticed some localized soreness, and a white bump near her eyelid margin of her right upper eyelid.  She resumed baby shampoo lid scrubs and hot packs.  For the past 2.5 days, her eyelids have become very diffusely red and swollen, extending up into her brow area, and today her lower eyelid is also swollen.  Her eyelid is very painful so she took Ibuprofen yesterday which helped.  She woke with some crusting on her eyelids yesterday (less crusting today, but right eye is watery today).  She has been using hot packs four times a day and has continued the lid scrubs with baby shampoo.  No vision changes.   I suggested using rice, hot packs and offered to have Dr. Mock review her message this afternoon, then I will call her back with Dr. Mock's recommendations.

## 2020-11-25 NOTE — TELEPHONE ENCOUNTER
Dr. Mock reviewed her chart and recommends the patient continue with frequent hot packs (rice packs) and to make an appointment for next week.   Called patient and informed her of Dr. Mock's recommendations.  Appointment scheduled for 12-3-20 at 8:45.  If her symptoms resolve, she will cancel.

## 2020-11-25 NOTE — TELEPHONE ENCOUNTER
Patient is seen by Dr. Mock. She has a stye on her right eye that she indicates  has treated her for in the past. She is asking for some options available. Tried to schedule appointment for her, but she was not able to make it. Her phone number is [phone].